# Patient Record
Sex: FEMALE | Race: OTHER | HISPANIC OR LATINO | URBAN - METROPOLITAN AREA
[De-identification: names, ages, dates, MRNs, and addresses within clinical notes are randomized per-mention and may not be internally consistent; named-entity substitution may affect disease eponyms.]

---

## 2017-07-26 ENCOUNTER — EMERGENCY (EMERGENCY)
Facility: HOSPITAL | Age: 62
LOS: 1 days | Discharge: HOME | End: 2017-07-26
Attending: EMERGENCY MEDICINE | Admitting: EMERGENCY MEDICINE
Payer: MEDICARE

## 2017-07-26 VITALS
RESPIRATION RATE: 18 BRPM | HEART RATE: 75 BPM | DIASTOLIC BLOOD PRESSURE: 64 MMHG | SYSTOLIC BLOOD PRESSURE: 110 MMHG | TEMPERATURE: 98 F | OXYGEN SATURATION: 99 %

## 2017-07-26 VITALS
TEMPERATURE: 99 F | WEIGHT: 162.04 LBS | RESPIRATION RATE: 18 BRPM | DIASTOLIC BLOOD PRESSURE: 82 MMHG | SYSTOLIC BLOOD PRESSURE: 187 MMHG | OXYGEN SATURATION: 99 % | HEART RATE: 98 BPM

## 2017-07-26 DIAGNOSIS — Z90.710 ACQUIRED ABSENCE OF BOTH CERVIX AND UTERUS: Chronic | ICD-10-CM

## 2017-07-26 DIAGNOSIS — E03.9 HYPOTHYROIDISM, UNSPECIFIED: ICD-10-CM

## 2017-07-26 DIAGNOSIS — E11.9 TYPE 2 DIABETES MELLITUS WITHOUT COMPLICATIONS: ICD-10-CM

## 2017-07-26 DIAGNOSIS — Z88.8 ALLERGY STATUS TO OTHER DRUGS, MEDICAMENTS AND BIOLOGICAL SUBSTANCES STATUS: ICD-10-CM

## 2017-07-26 DIAGNOSIS — Z79.2 LONG TERM (CURRENT) USE OF ANTIBIOTICS: ICD-10-CM

## 2017-07-26 DIAGNOSIS — R07.89 OTHER CHEST PAIN: ICD-10-CM

## 2017-07-26 DIAGNOSIS — I10 ESSENTIAL (PRIMARY) HYPERTENSION: ICD-10-CM

## 2017-07-26 DIAGNOSIS — R51 HEADACHE: ICD-10-CM

## 2017-07-26 DIAGNOSIS — Z90.49 ACQUIRED ABSENCE OF OTHER SPECIFIED PARTS OF DIGESTIVE TRACT: Chronic | ICD-10-CM

## 2017-07-26 DIAGNOSIS — Z79.4 LONG TERM (CURRENT) USE OF INSULIN: ICD-10-CM

## 2017-07-26 DIAGNOSIS — E78.5 HYPERLIPIDEMIA, UNSPECIFIED: ICD-10-CM

## 2017-07-26 DIAGNOSIS — Z79.82 LONG TERM (CURRENT) USE OF ASPIRIN: ICD-10-CM

## 2017-07-26 DIAGNOSIS — Z79.899 OTHER LONG TERM (CURRENT) DRUG THERAPY: ICD-10-CM

## 2017-07-26 DIAGNOSIS — E89.0 POSTPROCEDURAL HYPOTHYROIDISM: Chronic | ICD-10-CM

## 2017-07-26 LAB
APTT BLD: 36.3 SEC — SIGNIFICANT CHANGE UP (ref 27.5–37.4)
INR BLD: 1.01 — SIGNIFICANT CHANGE UP (ref 0.88–1.16)
PROTHROM AB SERPL-ACNC: 11.2 SEC — SIGNIFICANT CHANGE UP (ref 9.8–12.7)

## 2017-07-26 PROCEDURE — 85610 PROTHROMBIN TIME: CPT

## 2017-07-26 PROCEDURE — 93010 ELECTROCARDIOGRAM REPORT: CPT

## 2017-07-26 PROCEDURE — 99285 EMERGENCY DEPT VISIT HI MDM: CPT | Mod: 25

## 2017-07-26 PROCEDURE — 70450 CT HEAD/BRAIN W/O DYE: CPT

## 2017-07-26 PROCEDURE — 99284 EMERGENCY DEPT VISIT MOD MDM: CPT | Mod: 25

## 2017-07-26 PROCEDURE — 93005 ELECTROCARDIOGRAM TRACING: CPT

## 2017-07-26 PROCEDURE — 96375 TX/PRO/DX INJ NEW DRUG ADDON: CPT

## 2017-07-26 PROCEDURE — 85025 COMPLETE CBC W/AUTO DIFF WBC: CPT

## 2017-07-26 PROCEDURE — 80053 COMPREHEN METABOLIC PANEL: CPT

## 2017-07-26 PROCEDURE — 96374 THER/PROPH/DIAG INJ IV PUSH: CPT

## 2017-07-26 PROCEDURE — 82553 CREATINE MB FRACTION: CPT

## 2017-07-26 PROCEDURE — 70450 CT HEAD/BRAIN W/O DYE: CPT | Mod: 26

## 2017-07-26 PROCEDURE — 82550 ASSAY OF CK (CPK): CPT

## 2017-07-26 PROCEDURE — 36415 COLL VENOUS BLD VENIPUNCTURE: CPT

## 2017-07-26 PROCEDURE — 84484 ASSAY OF TROPONIN QUANT: CPT

## 2017-07-26 PROCEDURE — 85730 THROMBOPLASTIN TIME PARTIAL: CPT

## 2017-07-26 RX ORDER — MAGNESIUM SULFATE 500 MG/ML
2 VIAL (ML) INJECTION ONCE
Qty: 0 | Refills: 0 | Status: COMPLETED | OUTPATIENT
Start: 2017-07-26 | End: 2017-07-26

## 2017-07-26 RX ORDER — METOCLOPRAMIDE HCL 10 MG
10 TABLET ORAL ONCE
Qty: 0 | Refills: 0 | Status: COMPLETED | OUTPATIENT
Start: 2017-07-26 | End: 2017-07-26

## 2017-07-26 RX ORDER — LIDOCAINE 4 G/100G
1 CREAM TOPICAL
Qty: 1 | Refills: 0
Start: 2017-07-26 | End: 2017-08-02

## 2017-07-26 RX ORDER — DIPHENHYDRAMINE HCL 50 MG
25 CAPSULE ORAL ONCE
Qty: 0 | Refills: 0 | Status: COMPLETED | OUTPATIENT
Start: 2017-07-26 | End: 2017-07-26

## 2017-07-26 RX ORDER — ACETAMINOPHEN 500 MG
975 TABLET ORAL ONCE
Qty: 0 | Refills: 0 | Status: COMPLETED | OUTPATIENT
Start: 2017-07-26 | End: 2017-07-26

## 2017-07-26 RX ORDER — LIDOCAINE 4 G/100G
1 CREAM TOPICAL ONCE
Qty: 0 | Refills: 0 | Status: COMPLETED | OUTPATIENT
Start: 2017-07-26 | End: 2017-07-26

## 2017-07-26 RX ADMIN — Medication 25 MILLIGRAM(S): at 19:12

## 2017-07-26 RX ADMIN — Medication 50 GRAM(S): at 20:35

## 2017-07-26 RX ADMIN — Medication 975 MILLIGRAM(S): at 21:05

## 2017-07-26 RX ADMIN — Medication 975 MILLIGRAM(S): at 20:35

## 2017-07-26 RX ADMIN — LIDOCAINE 1 PATCH: 4 CREAM TOPICAL at 22:37

## 2017-07-26 RX ADMIN — Medication 10 MILLIGRAM(S): at 18:40

## 2017-07-26 NOTE — ED ADULT TRIAGE NOTE - CHIEF COMPLAINT QUOTE
"I have a headache coming from the back of my neck for the last 3 days and now I have tightness in my face"  darlene hypertensive in triage states that she took her blood pressure medication today. denies chest pain, sob

## 2017-07-26 NOTE — ED PROVIDER NOTE - MEDICAL DECISION MAKING DETAILS
headache x 3 days, gradual onset, no meningismus, nontoxic appearing, neuro exam nonfocal, will check labs and CT head, cp 2 days, nonexertional, no prior hx of CAD, screening ekg no overt ischemic findings, will trend cardiac panel, analgesia for HA and reassess, low suspicion for SAH

## 2017-07-26 NOTE — ED PROVIDER NOTE - PROGRESS NOTE DETAILS
pt feels improved continues to feel better, nontoxic appearing during reevaluation, ambulatory steadily, nonfocal neuro exam, very low suspicion for SAH.  2nd trop neg

## 2017-07-26 NOTE — ED PROVIDER NOTE - OBJECTIVE STATEMENT
62 yof pw headache x 3 days, from back of her head/right side of neck to front.  gradual onset, initially intermittent, now constant.  no double vision or blurry vision, no weakness, no photophobia.  no modifying or eliciting factors w/ headache.  also reported cp since yesterday, sternal.  hx of HTN.  cp nonexertional, no sob. 62 yof pw headache x 3 days, from back of her head/right side of neck to front.  gradual onset, initially intermittent, now constant.  took advil with some relief.  no double vision or blurry vision, no weakness, no photophobia.  no modifying or eliciting factors w/ headache.  also reported cp since yesterday, sternal.  hx of HTN.  cp nonexertional, no sob.

## 2017-07-26 NOTE — ED PROVIDER NOTE - PHYSICAL EXAMINATION
CON: ao x 3, HENMT: clear oropharynx, soft neck, no meningismus, full ROM of neck, HEAD: atraumatic, CV: rrr, equal pulses b/l, RESP: cta b/l, GI: +BS, soft, nontender, no rebound, no guarding, SKIN: no rash, MSK: tenderness to L cervical paraspinal and trapezius musculature, NEURO: neuro exam nonfocal, perrl, full EOMI, f-n normal, neg pronator, neg romberg, strength 5/5, normal gait, no dysmetria, no dysdiadochokinesia

## 2017-11-21 ENCOUNTER — EMERGENCY (EMERGENCY)
Facility: HOSPITAL | Age: 62
LOS: 1 days | Discharge: HOME | End: 2017-11-21
Admitting: EMERGENCY MEDICINE
Payer: MEDICARE

## 2017-11-21 VITALS
RESPIRATION RATE: 16 BRPM | TEMPERATURE: 98 F | SYSTOLIC BLOOD PRESSURE: 127 MMHG | DIASTOLIC BLOOD PRESSURE: 82 MMHG | OXYGEN SATURATION: 97 % | HEART RATE: 72 BPM

## 2017-11-21 VITALS
WEIGHT: 160.94 LBS | OXYGEN SATURATION: 98 % | SYSTOLIC BLOOD PRESSURE: 167 MMHG | RESPIRATION RATE: 16 BRPM | DIASTOLIC BLOOD PRESSURE: 81 MMHG | HEIGHT: 62 IN | TEMPERATURE: 99 F | HEART RATE: 112 BPM
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
## 2017-11-21 DIAGNOSIS — Z90.710 ACQUIRED ABSENCE OF BOTH CERVIX AND UTERUS: ICD-10-CM

## 2017-11-21 DIAGNOSIS — E11.9 TYPE 2 DIABETES MELLITUS WITHOUT COMPLICATIONS: ICD-10-CM

## 2017-11-21 DIAGNOSIS — E03.9 HYPOTHYROIDISM, UNSPECIFIED: ICD-10-CM

## 2017-11-21 DIAGNOSIS — Z98.890 OTHER SPECIFIED POSTPROCEDURAL STATES: ICD-10-CM

## 2017-11-21 DIAGNOSIS — Z90.710 ACQUIRED ABSENCE OF BOTH CERVIX AND UTERUS: Chronic | ICD-10-CM

## 2017-11-21 DIAGNOSIS — Z90.49 ACQUIRED ABSENCE OF OTHER SPECIFIED PARTS OF DIGESTIVE TRACT: Chronic | ICD-10-CM
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
## 2017-11-21 DIAGNOSIS — Z88.8 ALLERGY STATUS TO OTHER DRUGS, MEDICAMENTS AND BIOLOGICAL SUBSTANCES STATUS: ICD-10-CM

## 2017-11-21 DIAGNOSIS — Z79.82 LONG TERM (CURRENT) USE OF ASPIRIN: ICD-10-CM

## 2017-11-21 DIAGNOSIS — E78.5 HYPERLIPIDEMIA, UNSPECIFIED: ICD-10-CM

## 2017-11-21 DIAGNOSIS — M79.605 PAIN IN LEFT LEG: ICD-10-CM

## 2017-11-21 DIAGNOSIS — I10 ESSENTIAL (PRIMARY) HYPERTENSION: ICD-10-CM

## 2017-11-21 DIAGNOSIS — S89.92XA UNSPECIFIED INJURY OF LEFT LOWER LEG, INITIAL ENCOUNTER: ICD-10-CM

## 2017-11-21 DIAGNOSIS — E89.0 POSTPROCEDURAL HYPOTHYROIDISM: Chronic | ICD-10-CM

## 2017-11-21 PROCEDURE — 73590 X-RAY EXAM OF LOWER LEG: CPT | Mod: 26,LT

## 2017-11-21 PROCEDURE — 99283 EMERGENCY DEPT VISIT LOW MDM: CPT | Mod: 25

## 2017-11-21 PROCEDURE — 99283 EMERGENCY DEPT VISIT LOW MDM: CPT

## 2017-11-21 PROCEDURE — 73590 X-RAY EXAM OF LOWER LEG: CPT

## 2017-11-21 RX ORDER — ACETAMINOPHEN 500 MG
650 TABLET ORAL ONCE
Qty: 0 | Refills: 0 | Status: DISCONTINUED | OUTPATIENT
Start: 2017-11-21 | End: 2017-11-25

## 2017-11-21 RX ORDER — IBUPROFEN 200 MG
400 TABLET ORAL ONCE
Qty: 0 | Refills: 0 | Status: COMPLETED | OUTPATIENT
Start: 2017-11-21 | End: 2017-11-21

## 2017-11-21 RX ADMIN — Medication 400 MILLIGRAM(S): at 14:41

## 2017-11-21 NOTE — ED PROVIDER NOTE - CARE PLAN
Principal Discharge DX:	Shin injury  Instructions for follow-up, activity and diet:	Please follow up with your PMD - RICE

## 2017-11-21 NOTE — ED PROVIDER NOTE - PMH
Anxiety    Diabetes    GERD (gastroesophageal reflux disease)    HLD (hyperlipidemia)    Hypertension    Hypothyroid    Thyroid cancer    Vertigo

## 2017-11-21 NOTE — ED ADULT TRIAGE NOTE - CHIEF COMPLAINT QUOTE
pt c/o pain to left foot & ankle s/p fall a few days ago. swelling & bruising noted to ankle, abrasion on shin.

## 2017-11-21 NOTE — ED ADULT NURSE NOTE - OBJECTIVE STATEMENT
Patient arrived to ED via walk-in stating, "I tripped and fell a few days ago.  My ankle hurts."  Patient is AAOx3, in NAD, complaining of pain to left foot and ankle, with abrasion noted to left knee.  Patient denies LOC, head trauma or any other complaints.  Will continue with plan of care.

## 2017-11-21 NOTE — ED PROVIDER NOTE - OBJECTIVE STATEMENT
61 y/o female with a PMhx of HTN, DM II is present c/o left leg pain. Pt reports she fell last week and noticed pain to the left of her shin. Pt reports pain is constant and increases with walking, but reports she is still able to walk regardless of the pain. Pt denies the following: numbing/tingling sensation, swelling.

## 2017-11-21 NOTE — ED PROVIDER NOTE - MEDICAL DECISION MAKING DETAILS
61 y/o female with left shin pain. PE: minimal swelling with abrasion. Xray negative for acute fx. Able to ambulate safely without pain or abnormal gait. Pt advised RICE for treatment.

## 2018-06-15 ENCOUNTER — EMERGENCY (EMERGENCY)
Facility: HOSPITAL | Age: 63
LOS: 1 days | Discharge: HOME | End: 2018-06-15
Attending: EMERGENCY MEDICINE | Admitting: EMERGENCY MEDICINE
Payer: MEDICARE

## 2018-06-15 VITALS
RESPIRATION RATE: 18 BRPM | OXYGEN SATURATION: 98 % | DIASTOLIC BLOOD PRESSURE: 90 MMHG | SYSTOLIC BLOOD PRESSURE: 144 MMHG | HEART RATE: 82 BPM

## 2018-06-15 VITALS
WEIGHT: 158.07 LBS | SYSTOLIC BLOOD PRESSURE: 158 MMHG | DIASTOLIC BLOOD PRESSURE: 91 MMHG | OXYGEN SATURATION: 98 % | RESPIRATION RATE: 20 BRPM | HEART RATE: 100 BPM | TEMPERATURE: 98 F

## 2018-06-15 DIAGNOSIS — Z88.8 ALLERGY STATUS TO OTHER DRUGS, MEDICAMENTS AND BIOLOGICAL SUBSTANCES: ICD-10-CM

## 2018-06-15 DIAGNOSIS — Z79.2 LONG TERM (CURRENT) USE OF ANTIBIOTICS: ICD-10-CM

## 2018-06-15 DIAGNOSIS — Z90.49 ACQUIRED ABSENCE OF OTHER SPECIFIED PARTS OF DIGESTIVE TRACT: Chronic | ICD-10-CM

## 2018-06-15 DIAGNOSIS — E89.0 POSTPROCEDURAL HYPOTHYROIDISM: Chronic | ICD-10-CM

## 2018-06-15 DIAGNOSIS — I10 ESSENTIAL (PRIMARY) HYPERTENSION: ICD-10-CM

## 2018-06-15 DIAGNOSIS — R00.2 PALPITATIONS: ICD-10-CM

## 2018-06-15 DIAGNOSIS — E11.9 TYPE 2 DIABETES MELLITUS WITHOUT COMPLICATIONS: ICD-10-CM

## 2018-06-15 DIAGNOSIS — Z90.710 ACQUIRED ABSENCE OF BOTH CERVIX AND UTERUS: Chronic | ICD-10-CM

## 2018-06-15 DIAGNOSIS — E03.9 HYPOTHYROIDISM, UNSPECIFIED: ICD-10-CM

## 2018-06-15 DIAGNOSIS — E78.5 HYPERLIPIDEMIA, UNSPECIFIED: ICD-10-CM

## 2018-06-15 DIAGNOSIS — Z79.82 LONG TERM (CURRENT) USE OF ASPIRIN: ICD-10-CM

## 2018-06-15 DIAGNOSIS — Z79.899 OTHER LONG TERM (CURRENT) DRUG THERAPY: ICD-10-CM

## 2018-06-15 LAB
ALBUMIN SERPL ELPH-MCNC: 4.5 G/DL — SIGNIFICANT CHANGE UP (ref 3.3–5)
ALP SERPL-CCNC: 70 U/L — SIGNIFICANT CHANGE UP (ref 40–120)
ALT FLD-CCNC: 30 U/L — SIGNIFICANT CHANGE UP (ref 10–45)
ANION GAP SERPL CALC-SCNC: 13 MMOL/L — SIGNIFICANT CHANGE UP (ref 5–17)
AST SERPL-CCNC: 18 U/L — SIGNIFICANT CHANGE UP (ref 10–40)
BASOPHILS NFR BLD AUTO: 0.2 % — SIGNIFICANT CHANGE UP (ref 0–2)
BILIRUB SERPL-MCNC: 0.4 MG/DL — SIGNIFICANT CHANGE UP (ref 0.2–1.2)
BUN SERPL-MCNC: 10 MG/DL — SIGNIFICANT CHANGE UP (ref 7–23)
CALCIUM SERPL-MCNC: 9.3 MG/DL — SIGNIFICANT CHANGE UP (ref 8.4–10.5)
CHLORIDE SERPL-SCNC: 100 MMOL/L — SIGNIFICANT CHANGE UP (ref 96–108)
CK MB CFR SERPL CALC: 1.6 NG/ML — SIGNIFICANT CHANGE UP (ref 0–6.7)
CK SERPL-CCNC: 161 U/L — SIGNIFICANT CHANGE UP (ref 25–170)
CO2 SERPL-SCNC: 24 MMOL/L — SIGNIFICANT CHANGE UP (ref 22–31)
CREAT SERPL-MCNC: 0.58 MG/DL — SIGNIFICANT CHANGE UP (ref 0.5–1.3)
EOSINOPHIL NFR BLD AUTO: 3.3 % — SIGNIFICANT CHANGE UP (ref 0–6)
EXTRA BLUE TOP TUBE: SIGNIFICANT CHANGE UP
EXTRA SST TUBE: SIGNIFICANT CHANGE UP
GLUCOSE SERPL-MCNC: 248 MG/DL — HIGH (ref 70–99)
HCT VFR BLD CALC: 40.8 % — SIGNIFICANT CHANGE UP (ref 34.5–45)
HGB BLD-MCNC: 14 G/DL — SIGNIFICANT CHANGE UP (ref 11.5–15.5)
LIDOCAIN IGE QN: 16 U/L — SIGNIFICANT CHANGE UP (ref 7–60)
LYMPHOCYTES # BLD AUTO: 25.5 % — SIGNIFICANT CHANGE UP (ref 13–44)
MCHC RBC-ENTMCNC: 28.6 PG — SIGNIFICANT CHANGE UP (ref 27–34)
MCHC RBC-ENTMCNC: 34.3 G/DL — SIGNIFICANT CHANGE UP (ref 32–36)
MCV RBC AUTO: 83.4 FL — SIGNIFICANT CHANGE UP (ref 80–100)
MONOCYTES NFR BLD AUTO: 4.7 % — SIGNIFICANT CHANGE UP (ref 2–14)
NEUTROPHILS NFR BLD AUTO: 66.3 % — SIGNIFICANT CHANGE UP (ref 43–77)
PLATELET # BLD AUTO: 217 K/UL — SIGNIFICANT CHANGE UP (ref 150–400)
POTASSIUM SERPL-MCNC: 4.2 MMOL/L — SIGNIFICANT CHANGE UP (ref 3.5–5.3)
POTASSIUM SERPL-SCNC: 4.2 MMOL/L — SIGNIFICANT CHANGE UP (ref 3.5–5.3)
PROT SERPL-MCNC: 7.1 G/DL — SIGNIFICANT CHANGE UP (ref 6–8.3)
RBC # BLD: 4.89 M/UL — SIGNIFICANT CHANGE UP (ref 3.8–5.2)
RBC # FLD: 13.1 % — SIGNIFICANT CHANGE UP (ref 10.3–16.9)
SODIUM SERPL-SCNC: 137 MMOL/L — SIGNIFICANT CHANGE UP (ref 135–145)
TROPONIN T SERPL-MCNC: <0.01 NG/ML — SIGNIFICANT CHANGE UP (ref 0–0.01)
WBC # BLD: 6.6 K/UL — SIGNIFICANT CHANGE UP (ref 3.8–10.5)
WBC # FLD AUTO: 6.6 K/UL — SIGNIFICANT CHANGE UP (ref 3.8–10.5)

## 2018-06-15 PROCEDURE — 99284 EMERGENCY DEPT VISIT MOD MDM: CPT | Mod: 25

## 2018-06-15 PROCEDURE — 83690 ASSAY OF LIPASE: CPT

## 2018-06-15 PROCEDURE — 82550 ASSAY OF CK (CPK): CPT

## 2018-06-15 PROCEDURE — 93005 ELECTROCARDIOGRAM TRACING: CPT

## 2018-06-15 PROCEDURE — 84443 ASSAY THYROID STIM HORMONE: CPT

## 2018-06-15 PROCEDURE — 80053 COMPREHEN METABOLIC PANEL: CPT

## 2018-06-15 PROCEDURE — 99285 EMERGENCY DEPT VISIT HI MDM: CPT | Mod: 25

## 2018-06-15 PROCEDURE — 84484 ASSAY OF TROPONIN QUANT: CPT

## 2018-06-15 PROCEDURE — 71046 X-RAY EXAM CHEST 2 VIEWS: CPT

## 2018-06-15 PROCEDURE — 36415 COLL VENOUS BLD VENIPUNCTURE: CPT

## 2018-06-15 PROCEDURE — 71046 X-RAY EXAM CHEST 2 VIEWS: CPT | Mod: 26

## 2018-06-15 PROCEDURE — 85025 COMPLETE CBC W/AUTO DIFF WBC: CPT

## 2018-06-15 PROCEDURE — 93010 ELECTROCARDIOGRAM REPORT: CPT

## 2018-06-15 PROCEDURE — 96374 THER/PROPH/DIAG INJ IV PUSH: CPT

## 2018-06-15 PROCEDURE — 82553 CREATINE MB FRACTION: CPT

## 2018-06-15 PROCEDURE — 96375 TX/PRO/DX INJ NEW DRUG ADDON: CPT

## 2018-06-15 RX ORDER — FAMOTIDINE 10 MG/ML
20 INJECTION INTRAVENOUS ONCE
Qty: 0 | Refills: 0 | Status: COMPLETED | OUTPATIENT
Start: 2018-06-15 | End: 2018-06-15

## 2018-06-15 RX ORDER — METOCLOPRAMIDE HCL 10 MG
10 TABLET ORAL ONCE
Qty: 0 | Refills: 0 | Status: COMPLETED | OUTPATIENT
Start: 2018-06-15 | End: 2018-06-15

## 2018-06-15 RX ADMIN — FAMOTIDINE 20 MILLIGRAM(S): 10 INJECTION INTRAVENOUS at 20:10

## 2018-06-15 RX ADMIN — Medication 104 MILLIGRAM(S): at 20:13

## 2018-06-15 NOTE — ED ADULT TRIAGE NOTE - AS O2 DELIVERY
Problem: Knowledge Deficit  Goal: Patient/family/caregiver demonstrates understanding of disease process, treatment plan, medications, and discharge instructions  Complete learning assessment and assess knowledge base    Interventions:  - Provide teaching at level of understanding  - Provide teaching via preferred learning methods   Outcome: Progressing
room air

## 2018-06-15 NOTE — ED PROVIDER NOTE - OBJECTIVE STATEMENT
62 yo female with h/o HTN, DM, anxiety, hypothyroid, in the Er c/o palpitations and chest discomfort, burning like sensations. Pt reports symptoms started today at rest. Pt was seen at [facility] 3 days ago for the same symptoms, was admitted overnight and had stress test done, as well as had multiple times cardiac enzymes checked. Pt reports that her results were normal and she was discharge home yesterday. Pt has f/u appointment scheduled next week. Symptoms started today again and she was afraid to stay at home. At first she went to the same ER at [facility], but because ER was very busy and nurses were changing there shift, pt decided to come to [facility]. Denies fever, chills, CP, abdominal pain or back pain.

## 2018-06-15 NOTE — ED PROVIDER NOTE - ATTENDING CONTRIBUTION TO CARE
63 yof pw   62 yo female with h/o HTN, DM, anxiety, hypothyroid, in the Er c/o palpitations and chest discomfort, burning like sensations. Pt reports symptoms started today at rest. Pt was seen at [place] ER 3 days ago for the same symptoms, was admitted overnight and had stress test done, as well as had multiple times cardiac enzymes checked. Pt reports that her results were normal and she was discharge home yesterday. Pt has f/u appointment scheduled next week. Symptoms started today again and she was afraid to stay at home. At first she went to the same ER at [facility], but because ER was very busy and nurses were changing there shift, pt decided to come to [facility]. Denies fever, chills, CP, abdominal pain or back pain.

## 2018-06-15 NOTE — ED ADULT NURSE NOTE - OBJECTIVE STATEMENT
Pt w/ PMH of HTN, DM, anxiety & hypothyroid presents to ED today c/o palpitations.  Pt elicits symptoms have come and gone over the last week and has been evaluated w/ cardiac enzyme tests x3 times.  Pt states sxs returned today and she presents for reevaluation.  Pt denies dizziness, SOB, cough, fever/chills or edema.  Pt is pending eval by [facility].  Pt on [facility].

## 2018-06-15 NOTE — ED ADULT NURSE NOTE - CHPI ED SYMPTOMS NEG
no chills/no dizziness/no back pain/no chest pain/no fever/no shortness of breath/no diaphoresis/no cough/no syncope/no nausea/no vomiting

## 2018-06-15 NOTE — ED PROVIDER NOTE - MEDICAL DECISION MAKING DETAILS
64 yo female with h/o HTN, DM, anxiety and hypothyroidism, in the ER after sudden onset of palpitations, burning sensations in her chest.   Pt had similar symptoms 3 days ago and was seen at the different ER, admitted for overnight/observation unit and d/steven home next day. Pt reports that her work up was negative.  Pt admits h/o anxiety and was prescribed medications for it. Pt appears anxious and per description of her symptoms tonight seem like she was hyperventilating. ECG- non-ischemic, will continue monitor and check labs. Pt feels much  better at present. No tachycardia, no tachypnea, no COB or CP.

## 2018-06-16 LAB
CK MB CFR SERPL CALC: 1.4 NG/ML — SIGNIFICANT CHANGE UP (ref 0–6.7)
CK SERPL-CCNC: 155 U/L — SIGNIFICANT CHANGE UP (ref 25–170)
TROPONIN T SERPL-MCNC: <0.01 NG/ML — SIGNIFICANT CHANGE UP (ref 0–0.01)

## 2019-02-04 NOTE — ED ADULT TRIAGE NOTE - BP NONINVASIVE SYSTOLIC (MM HG)
----- Message from Ebony Frey sent at 2/4/2019  2:59 PM CST -----  Contact: [phone]   Pt its requesting lab work test results done 01/25/19 . Please advise   
Spoke with patient and she was asking if she had a PT/INR completed and informed patient that she did not have it done but reminded that she has a follow up in a month. Patient stated she would wait.   
158

## 2019-09-26 ENCOUNTER — OFFICE VISIT (OUTPATIENT)
Dept: ORTHOPEDIC SURGERY | Facility: CLINIC | Age: 64
End: 2019-09-26

## 2019-09-26 VITALS
SYSTOLIC BLOOD PRESSURE: 153 MMHG | HEIGHT: 62 IN | RESPIRATION RATE: 16 BRPM | TEMPERATURE: 98 F | WEIGHT: 160 LBS | DIASTOLIC BLOOD PRESSURE: 95 MMHG | BODY MASS INDEX: 29.44 KG/M2 | HEART RATE: 74 BPM

## 2019-09-26 DIAGNOSIS — M75.51 CHRONIC BURSITIS OF RIGHT SHOULDER: Primary | ICD-10-CM

## 2019-09-26 DIAGNOSIS — G89.29 CHRONIC RIGHT SHOULDER PAIN: ICD-10-CM

## 2019-09-26 DIAGNOSIS — M25.511 CHRONIC RIGHT SHOULDER PAIN: ICD-10-CM

## 2019-09-26 RX ORDER — METFORMIN HYDROCHLORIDE EXTENDED-RELEASE TABLETS 1000 MG/1
1000 TABLET, FILM COATED, EXTENDED RELEASE ORAL
COMMUNITY
Start: 2019-08-09

## 2019-09-26 RX ORDER — GUAIFENESIN 100 MG/5ML
81 LIQUID (ML) ORAL
COMMUNITY

## 2019-09-26 RX ORDER — MECLIZINE HYDROCHLORIDE 25 MG/1
25 TABLET ORAL
COMMUNITY
Start: 2019-07-08

## 2019-09-26 RX ORDER — BETAMETHASONE SODIUM PHOSPHATE AND BETAMETHASONE ACETATE 3; 3 MG/ML; MG/ML
6 INJECTION, SUSPENSION INTRA-ARTICULAR; INTRALESIONAL; INTRAMUSCULAR; SOFT TISSUE ONCE
Qty: 0.5 ML | Refills: 0
Start: 2019-09-26 | End: 2019-09-26

## 2019-09-26 RX ORDER — INSULIN ASPART 100 [IU]/ML
INJECTION, SOLUTION INTRAVENOUS; SUBCUTANEOUS
COMMUNITY
Start: 2019-08-09

## 2019-09-26 RX ORDER — METOPROLOL TARTRATE 25 MG/1
25 TABLET, FILM COATED ORAL
COMMUNITY
Start: 2019-06-14

## 2019-09-26 RX ORDER — OMEPRAZOLE 20 MG/1
20 CAPSULE, DELAYED RELEASE ORAL
COMMUNITY
Start: 2019-07-08

## 2019-09-26 RX ORDER — EZETIMIBE 10 MG/1
10 TABLET ORAL
COMMUNITY
Start: 2019-07-08

## 2019-09-26 RX ORDER — ROSUVASTATIN CALCIUM 40 MG/1
40 TABLET, COATED ORAL
COMMUNITY
Start: 2019-07-08

## 2019-09-26 RX ORDER — LEVOTHYROXINE SODIUM 150 UG/1
150 TABLET ORAL
COMMUNITY
Start: 2019-07-08

## 2019-09-26 RX ORDER — AMLODIPINE BESYLATE 5 MG/1
5 TABLET ORAL
COMMUNITY
Start: 2019-07-08

## 2019-09-26 RX ORDER — LISINOPRIL 40 MG/1
40 TABLET ORAL
COMMUNITY
Start: 2019-07-08

## 2019-09-26 RX ORDER — INSULIN GLARGINE 100 [IU]/ML
40 INJECTION, SOLUTION SUBCUTANEOUS
COMMUNITY
Start: 2019-09-09

## 2019-09-26 RX ORDER — DICLOFENAC SODIUM 10 MG/G
2 GEL TOPICAL
COMMUNITY
Start: 2019-08-09

## 2019-09-26 RX ORDER — NAPROXEN 250 MG/1
250 TABLET ORAL
COMMUNITY

## 2019-09-26 RX ORDER — PAROXETINE HYDROCHLORIDE 40 MG/1
40 TABLET, FILM COATED ORAL
COMMUNITY
Start: 2019-08-09

## 2019-09-26 RX ORDER — CLONAZEPAM 0.5 MG/1
0.5 TABLET ORAL
COMMUNITY
Start: 2019-09-09

## 2019-09-26 NOTE — PROGRESS NOTES
Verbal order given by Dr. Duane Jordan to draw up 4mL 0.25% Sensorcaine and 0.5 mL 30 mg/5mL Betamethasone.

## 2019-09-26 NOTE — PROGRESS NOTES
Patient: Maulik Galeano                MRN: [id number]       SSN: [id number]  YOB: 1955        AGE: 62 y.o. SEX: female    PCP: No primary care provider on file. 09/26/19    CC: RIGHT SHOULDER PAIN    HISTORY:  Maulik Galeano is a 62 y.o. female who is seen for right shoulder pain. She has been experiencing shoulder pain for the past 4 months. She does not recall any injury. She notes shoulder pain with overhead activities and at night. She has to sleep with her arm adducted and her hand across her chest.  She has difficulty laying on her right side. She feels more pain when the weather changes. She also notes shoulder pain radiating to her right elbow. She has difficulty even putting her bra on. She has seen her PCP and received a right shoulder cortisone injection. She feels like her shoulder feels like a stretched rubber band. She cannot even pull her pants up. She is right handed. She has been taking Advil with some relief. Her pain causes her to cry sometimes. Pain Assessment  9/26/2019   Location of Pain Arm   Location Modifiers Right   Severity of Pain 0   Limiting Behavior No     Occupation, etc:  Ms. Haley Lawson receives social security disability benefits for depression. 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 She mostly stays at home now. She was born in [place] and raised in [place]. She lives with her twin 13 yo and 23 yo grandsons in [place]. She has 1 son, 4 daughters, 23 grandchildren and 5 great grandchildren. Ms. Haley Lawson weighs 160 lbs and is 5'2\" tall. No results found for: HBA1C, HGBE8, EVX3XVIO, YUB4XLWP, PLF6POYN  Weight Metrics 9/26/2019   Weight 160 lb   BMI 29.26 kg/m2       There is no problem list on file for this patient. REVIEW OF SYSTEMS: All Below are Negative except: See HPI   Constitutional: negative for fever, chills, and weight loss.    Cardiovascular: negative for chest pain, claudication, leg swelling, SOB, DOSHI   Gastrointestinal: Negative for pain, N/V/C/D, Blood in stool or urine, dysuria, hematuria, incontinence, pelvic pain. Musculoskeletal: See HPI   Neurological: Negative for dizziness and weakness. Negative for headaches, Visual changes, confusion, seizures   Phychiatric/Behavioral: Negative for depression, memory loss, substance abuse. Extremities: Negative for hair changes, rash, or skin lesion changes. Hematologic: Negative for bleeding problems, bruising, pallor or swollen lymph nodes   Peripheral Vascular: No calf pain, no circulation deficits.     Social History     Socioeconomic History    Marital status: SINGLE     Spouse name: Not on file    Number of children: Not on file    Years of education: Not on file    Highest education level: Not on file   Occupational History    Not on file   Social Needs    Financial resource strain: Not on file    Food insecurity:     Worry: Not on file     Inability: Not on file    Transportation needs:     Medical: Not on file     Non-medical: Not on file   Tobacco Use    Smoking status: Former Smoker    Smokeless tobacco: Never Used   Substance and Sexual Activity    Alcohol use: Never     Frequency: Never    Drug use: Never    Sexual activity: Not on file   Lifestyle    Physical activity:     Days per week: Not on file     Minutes per session: Not on file    Stress: Not on file   Relationships    Social connections:     Talks on phone: Not on file     Gets together: Not on file     Attends Orthodox service: Not on file     Active member of club or organization: Not on file     Attends meetings of clubs or organizations: Not on file     Relationship status: Not on file    Intimate partner violence:     Fear of current or ex partner: Not on file     Emotionally abused: Not on file     Physically abused: Not on file     Forced sexual activity: Not on file   Other Topics Concern    Not on file   Social History Narrative    Not on file      Allergies   Allergen Reactions    Gabapentin Anaphylaxis      Current Outpatient Medications   Medication Sig    amLODIPine (NORVASC) 5 mg tablet Take 5 mg by mouth.  aspirin 81 mg chewable tablet Take 81 mg by mouth.  glucose blood VI test strips (ASCENSIA AUTODISC VI, ONE TOUCH ULTRA TEST VI) strip Check glucose 3 times a day, Dx insulin dependent type 2 DM    clonazePAM (KLONOPIN) 0.5 mg tablet Take 0.5 mg by mouth.  diclofenac (VOLTAREN) 1 % gel Apply 2 g to affected area.  ezetimibe (ZETIA) 10 mg tablet Take 10 mg by mouth.  insulin aspart U-100 (NOVOLOG) 100 unit/mL (3 mL) inpn 15 units in the morning, 15 units in afternoon, 15 units in evening    insulin glargine (LANTUS,BASAGLAR) 100 unit/mL (3 mL) inpn 40 Units by SubCUTAneous route.  levothyroxine (SYNTHROID) 150 mcg tablet Take 150 mcg by mouth.  lisinopril (PRINIVIL, ZESTRIL) 40 mg tablet Take 40 mg by mouth.  meclizine (ANTIVERT) 25 mg tablet Take 25 mg by mouth.  metFORMIN ER 1,000 mg tr24 Take 1,000 mg by mouth.  metoprolol tartrate (LOPRESSOR) 25 mg tablet Take 25 mg by mouth.  naproxen (NAPROSYN) 250 mg tablet Take 250 mg by mouth.  omeprazole (PRILOSEC) 20 mg capsule Take 20 mg by mouth.  PARoxetine (PAXIL) 40 mg tablet Take 40 mg by mouth.  rosuvastatin (CRESTOR) 40 mg tablet Take 40 mg by mouth.  betamethasone (CELESTONE SOLUSPAN) 6 mg/mL injection 1 mL by Intra artICUlar route once for 1 dose. No current facility-administered medications for this visit.        PHYSICAL EXAMINATION:  Visit Vitals  BP (!) 153/95   Pulse 74   Temp 98 °F (36.7 °C) (Oral)   Resp 16   Ht 5' 2\" (1.575 m)   Wt 160 lb (72.6 kg)   BMI 29.26 kg/m²      ORTHO EXAMINATION:  Examination Right shoulder Left shoulder   Skin Intact Intact   Effusion - -   Biceps deformity - -   Atrophy - -   AC joint tenderness - -   Acromial tenderness + -   Biceps tenderness - -   Forward flexion/Elevation  ARRON >90 170 Active abduction  160   External rotation ROM 5 30   Internal rotation ROM 70 90   Apprehension - -   Impingement + -   Drop Arm Test - -   Neurovascular Intact Intact     Pain on returning arm to side    TIME OUT:  Chart reviewed for the following:   I, Christiano Cristina MD, have reviewed the History, Physical and updated the Allergic reactions for [address] performed immediately prior to start of procedure:  Lynn Juarez MD, have performed the following reviews on Trav Carty prior to the start of the procedure:          * Patient was identified by name and date of birth   * Agreement on procedure being performed was verified  * Risks and Benefits explained to the patient  * Procedure site verified and marked as necessary  * Patient was positioned for comfort  * Consent was obtained     Time: 9:02 AM     Date of procedure: 9/26/2019  Procedure performed by:  Christiano Cristina MD  Ms. Ondina Guaman tolerated the procedure well with no complications. RADIOGRAPHS:  XR RIGHT SHOULDER 5/16/19 [facility] ED  IMPRESSION  Negative for fracture or dislocation in the right shoulder.  -I have independently reviewed these images during this office visit. -Dr. Jazmyne Fischer:  Three views - No fractures, moderate acromioclavicular narrowing, no glenohumeral narrowing, + calcific densities, small inferior osteophyte      IMPRESSION:      ICD-10-CM ICD-9-CM    1. Chronic bursitis of right shoulder M75.51 726.10 betamethasone (CELESTONE SOLUSPAN) 6 mg/mL injection      BETAMETHASONE ACETATE & SODIUM PHOSPHATE INJECTION 3 MG EA.      DRAIN/INJECT LARGE JOINT/BURSA      REFERRAL TO PHYSICAL THERAPY   2.  Chronic right shoulder pain M25.511 719.41 betamethasone (CELESTONE SOLUSPAN) 6 mg/mL injection    G89.29 338.29 BETAMETHASONE ACETATE & SODIUM PHOSPHATE INJECTION 3 MG EA.      DRAIN/INJECT LARGE JOINT/BURSA      REFERRAL TO PHYSICAL THERAPY     PLAN:  After discussing treatment options, patient's right shoulder was injected with 4 cc Marcaine and 1/2 cc Celestone. There is no need for surgery at this time. We discussed a possible need for right shoulder MR arthrogram in the future if pain continues. She will start a brief course of outpatient physical therapy. She will follow up as needed.       Scribed by Dmitri Maria (Kurt Mckeon) as dictated by Ad Sierra MD

## 2019-10-02 ENCOUNTER — APPOINTMENT (OUTPATIENT)
Dept: PHYSICAL THERAPY | Age: 64
End: 2019-10-02

## 2019-12-03 ENCOUNTER — HOSPITAL ENCOUNTER (OUTPATIENT)
Dept: GENERAL RADIOLOGY | Age: 64
Discharge: HOME | End: 2019-12-03
Attending: SPECIALIST
Payer: MEDICARE

## 2019-12-03 ENCOUNTER — HOSPITAL ENCOUNTER (OUTPATIENT)
Dept: MRI IMAGING | Age: 64
Discharge: HOME | End: 2019-12-03
Attending: SPECIALIST
Payer: MEDICARE

## 2019-12-03 DIAGNOSIS — M25.511 CHRONIC RIGHT SHOULDER PAIN: ICD-10-CM

## 2019-12-03 DIAGNOSIS — M75.51 CHRONIC BURSITIS OF RIGHT SHOULDER: ICD-10-CM

## 2019-12-03 DIAGNOSIS — G89.29 CHRONIC RIGHT SHOULDER PAIN: ICD-10-CM

## 2019-12-03 PROCEDURE — 74011250636 HC RX REV CODE- 250/636: Performed by: SPECIALIST

## 2019-12-03 PROCEDURE — A9575 INJ GADOTERATE MEGLUMI 0.1ML: HCPCS | Performed by: SPECIALIST

## 2019-12-03 PROCEDURE — 74011000250 HC RX REV CODE- 250: Performed by: SPECIALIST

## 2019-12-03 PROCEDURE — 73222 MRI JOINT UPR EXTREM W/DYE: CPT

## 2019-12-03 PROCEDURE — 77002 NEEDLE LOCALIZATION BY XRAY: CPT

## 2019-12-03 PROCEDURE — 74011636320 HC RX REV CODE- 636/320: Performed by: SPECIALIST

## 2019-12-03 RX ORDER — SODIUM CHLORIDE 9 MG/ML
10 INJECTION INTRAMUSCULAR; INTRAVENOUS; SUBCUTANEOUS
Status: COMPLETED | OUTPATIENT
Start: 2019-12-03 | End: 2019-12-03

## 2019-12-03 RX ORDER — LIDOCAINE HYDROCHLORIDE 10 MG/ML
30 INJECTION, SOLUTION EPIDURAL; INFILTRATION; INTRACAUDAL; PERINEURAL
Status: COMPLETED | OUTPATIENT
Start: 2019-12-03 | End: 2019-12-03

## 2019-12-03 RX ORDER — GADOTERATE MEGLUMINE 376.9 MG/ML
5 INJECTION INTRAVENOUS
Status: COMPLETED | OUTPATIENT
Start: 2019-12-03 | End: 2019-12-03

## 2019-12-03 RX ADMIN — LIDOCAINE HYDROCHLORIDE 10 ML: 10 INJECTION, SOLUTION EPIDURAL; INFILTRATION; INTRACAUDAL; PERINEURAL at 12:07

## 2019-12-03 RX ADMIN — SODIUM CHLORIDE 10 ML: 9 INJECTION, SOLUTION INTRAMUSCULAR; INTRAVENOUS; SUBCUTANEOUS at 12:07

## 2019-12-03 RX ADMIN — GADOTERATE MEGLUMINE 0.15 ML: 376.9 INJECTION INTRAVENOUS at 12:07

## 2019-12-03 RX ADMIN — IOPAMIDOL 11 ML: 408 INJECTION, SOLUTION INTRATHECAL at 12:07

## 2019-12-12 ENCOUNTER — OFFICE VISIT (OUTPATIENT)
Dept: ORTHOPEDIC SURGERY | Facility: CLINIC | Age: 64
End: 2019-12-12

## 2019-12-12 VITALS
BODY MASS INDEX: 29.3 KG/M2 | DIASTOLIC BLOOD PRESSURE: 93 MMHG | RESPIRATION RATE: 15 BRPM | SYSTOLIC BLOOD PRESSURE: 168 MMHG | HEIGHT: 62 IN | WEIGHT: 159.2 LBS | HEART RATE: 79 BPM | TEMPERATURE: 96.8 F

## 2019-12-12 DIAGNOSIS — M25.511 CHRONIC RIGHT SHOULDER PAIN: ICD-10-CM

## 2019-12-12 DIAGNOSIS — G89.29 CHRONIC RIGHT SHOULDER PAIN: ICD-10-CM

## 2019-12-12 DIAGNOSIS — S43.431A LABRAL TEAR OF SHOULDER, RIGHT, INITIAL ENCOUNTER: Primary | ICD-10-CM

## 2019-12-12 NOTE — PROGRESS NOTES
Patient: Mark Domínguez                MRN: [id number]       SSN: [id number]  YOB: 1955        AGE: 62 y.o. SEX: female    PCP: Becca Monson MD  12/12/19    CC: RIGHT SHOULDER PAIN    HISTORY:  Mark Domínguez is a 62 y.o. female who is seen for continued right shoulder pain. She has been experiencing shoulder pain for the past 4 months. She does not recall any injury. She notes shoulder pain with overhead activities and at night. She has to sleep with her arm adducted and her hand across her chest.  She has difficulty laying on her right side. She feels more pain when the weather changes. She also notes shoulder pain radiating to her right elbow. She has difficulty even putting her bra on. She has seen her PCP and received a right shoulder cortisone injection. She feels like her shoulder feels like a stretched rubber band. She cannot even pull her pants up. Sometimes her right arm gives away while she is pouring her coffee in the morning. She is right handed. She has been taking Advil with some relief. Her pain causes her to cry sometimes. Pain Assessment  12/12/2019   Location of Pain Shoulder   Location Modifiers Right   Severity of Pain 6   Quality of Pain Aching;Dull   Duration of Pain Persistent   Frequency of Pain Constant   Aggravating Factors Bending;Stretching;Straightening   Aggravating Factors Comment holding heavy object   Limiting Behavior -   Relieving Factors Nothing   Result of Injury No     Occupation, etc:  Ms. Nancy Carrington receives social security disability benefits for depression. 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 She mostly stays at home now. She was born in [place] and raised in [place]. She lives with her twin 11 yo and 23 yo grandsons in [place]. She has 1 son, 4 daughters, 23 grandchildren and 5 great grandchildren. Ms. Nancy Carrington weighs 160 lbs and is 5'2\" tall.        No results found for: HBA1C, HGBE8, ZPH5DKFR, TMJ2IVAL, JYV5ZAUD  Weight Metrics 12/12/2019 9/26/2019   Weight 159 lb 3.2 oz 160 lb   BMI 29.12 kg/m2 29.26 kg/m2       There is no problem list on file for this patient. REVIEW OF SYSTEMS: All Below are Negative except: See HPI   Constitutional: negative for fever, chills, and weight loss. Cardiovascular: negative for chest pain, claudication, leg swelling, SOB, DOSHI   Gastrointestinal: Negative for pain, N/V/C/D, Blood in stool or urine, dysuria, hematuria, incontinence, pelvic pain. Musculoskeletal: See HPI   Neurological: Negative for dizziness and weakness. Negative for headaches, Visual changes, confusion, seizures   Phychiatric/Behavioral: Negative for depression, memory loss, substance abuse. Extremities: Negative for hair changes, rash, or skin lesion changes. Hematologic: Negative for bleeding problems, bruising, pallor or swollen lymph nodes   Peripheral Vascular: No calf pain, no circulation deficits.     Social History     Socioeconomic History    Marital status: SINGLE     Spouse name: Not on file    Number of children: Not on file    Years of education: Not on file    Highest education level: Not on file   Occupational History    Not on file   Social Needs    Financial resource strain: Not on file    Food insecurity:     Worry: Not on file     Inability: Not on file    Transportation needs:     Medical: Not on file     Non-medical: Not on file   Tobacco Use    Smoking status: Former Smoker    Smokeless tobacco: Never Used   Substance and Sexual Activity    Alcohol use: Never     Frequency: Never    Drug use: Never    Sexual activity: Not on file   Lifestyle    Physical activity:     Days per week: Not on file     Minutes per session: Not on file    Stress: Not on file   Relationships    Social connections:     Talks on phone: Not on file     Gets together: Not on file     Attends Congregation service: Not on file     Active member of club or organization: Not on file     Attends meetings of clubs or organizations: Not on file     Relationship status: Not on file    Intimate partner violence:     Fear of current or ex partner: Not on file     Emotionally abused: Not on file     Physically abused: Not on file     Forced sexual activity: Not on file   Other Topics Concern    Not on file   Social History Narrative    Not on file      Allergies   Allergen Reactions    Gabapentin Anaphylaxis      Current Outpatient Medications   Medication Sig    amLODIPine (NORVASC) 5 mg tablet Take 5 mg by mouth.  aspirin 81 mg chewable tablet Take 81 mg by mouth.  glucose blood VI test strips (ASCENSIA AUTODISC VI, ONE TOUCH ULTRA TEST VI) strip Check glucose 3 times a day, Dx insulin dependent type 2 DM    clonazePAM (KLONOPIN) 0.5 mg tablet Take 0.5 mg by mouth.  ezetimibe (ZETIA) 10 mg tablet Take 10 mg by mouth.  insulin aspart U-100 (NOVOLOG) 100 unit/mL (3 mL) inpn 15 units in the morning, 15 units in afternoon, 15 units in evening    insulin glargine (LANTUS,BASAGLAR) 100 unit/mL (3 mL) inpn 40 Units by SubCUTAneous route.  levothyroxine (SYNTHROID) 150 mcg tablet Take 150 mcg by mouth.  lisinopril (PRINIVIL, ZESTRIL) 40 mg tablet Take 40 mg by mouth.  meclizine (ANTIVERT) 25 mg tablet Take 25 mg by mouth.  metFORMIN ER 1,000 mg tr24 Take 1,000 mg by mouth.  metoprolol tartrate (LOPRESSOR) 25 mg tablet Take 25 mg by mouth.  omeprazole (PRILOSEC) 20 mg capsule Take 20 mg by mouth.  PARoxetine (PAXIL) 40 mg tablet Take 40 mg by mouth.  rosuvastatin (CRESTOR) 40 mg tablet Take 40 mg by mouth.  diclofenac (VOLTAREN) 1 % gel Apply 2 g to affected area.  naproxen (NAPROSYN) 250 mg tablet Take 250 mg by mouth. No current facility-administered medications for this visit.        PHYSICAL EXAMINATION:  Visit Vitals  BP (!) 168/93 (BP 1 Location: Left arm, BP Patient Position: Sitting)   Pulse 79   Temp 96.8 °F (36 °C) (Oral) Resp 15   Ht 5' 2\" (1.575 m)   Wt 159 lb 3.2 oz (72.2 kg)   BMI 29.12 kg/m²      ORTHO EXAMINATION:  Examination Right shoulder Left shoulder   Skin Intact Intact   Effusion - -   Biceps deformity - -   Atrophy - -   AC joint tenderness - -   Acromial tenderness + -   Biceps tenderness - -   Forward flexion/Elevation  ARRON >90 170   Active abduction  160   External rotation ROM 5 30   Internal rotation ROM 70 90   Apprehension - -   Impingement + -   Drop Arm Test - -   Neurovascular Intact Intact     Pain on returning arm to side    RIGHT SHOULDER MR ARTHROGRAM 12/3/19 [facility]  IMPRESSION:     Marked degenerative tearing of the labrum as described.  -A few probable full-thickness glenoid cartilage fissures, not well visualized.     Supraspinatus and infraspinatus tendinopathy without discrete tear.  -Acromion with type III morphology, and mild AC joint degenerative changes, but  no significant lateral downsloping.  -Trace subacromial subdeltoid bursal edema.     There is some infiltration or extravasation of contrast along the inferior  glenohumeral capsule/ligaments. Although these findings may be  iatrogenic/injection related, inferior glenohumeral ligamental/capsular injury  is difficult to exclude. Clinical correlation advised.     Perhaps mild intra-articular biceps tendinopathy. RADIOGRAPHS:  XR RIGHT SHOULDER 5/16/19 [facility] ED  IMPRESSION  Negative for fracture or dislocation in the right shoulder.  -I have independently reviewed these images during this office visit. -Dr. Sara Banerjee:  Three views - No fractures, moderate acromioclavicular narrowing, no glenohumeral narrowing, + calcific densities, small inferior osteophyte      IMPRESSION:      ICD-10-CM ICD-9-CM    1. Labral tear of shoulder, right, initial encounter S43.431A 840.8    2. Chronic right shoulder pain M25.511 719.41     G89.29 338.29      PLAN:  Right shoulder MR arthrogram results discussed.  We discussed possible need for right shoulder arthroscopy & labrum repair at some time in the future if pain continues. She will follow up PRN with Dr. Joleen Francisco for orthopedic shoulder surgery consultation.      Scribed by Genna Garza (Cyndi Blevins) as dictated by Candice Alex MD

## 2019-12-12 NOTE — PROGRESS NOTES
1. Have you been to the ER, urgent care clinic since your last visit? Hospitalized since your last visit? No    2. Have you seen or consulted any other health care providers outside of the [address] [place] [address] since your last visit? Include any pap smears or colon screening.  No

## 2019-12-17 ENCOUNTER — TELEPHONE (OUTPATIENT)
Dept: ORTHOPEDIC SURGERY | Facility: CLINIC | Age: 64
End: 2019-12-17

## 2019-12-17 NOTE — TELEPHONE ENCOUNTER
Addressed with Dr. Scooby Peters and he stated that because of the opioid crisis he was unable to give the patient any pain medication. She should use OTC pain relief as directed. I called the patient and let her know that we could not prescribe her anything. She stated she understood.

## 2019-12-17 NOTE — TELEPHONE ENCOUNTER
Patient called and advised she is in extreme pain - she asked if [facility] can prescribe her pain medication for her shoulder to hold her over until her new patient appt with [facility] on 12/20/19.        Patient can be reached: [phone]

## 2019-12-20 ENCOUNTER — OFFICE VISIT (OUTPATIENT)
Dept: ORTHOPEDIC SURGERY | Facility: CLINIC | Age: 64
End: 2019-12-20

## 2019-12-20 VITALS
WEIGHT: 159 LBS | RESPIRATION RATE: 16 BRPM | HEART RATE: 78 BPM | TEMPERATURE: 97.8 F | OXYGEN SATURATION: 98 % | HEIGHT: 62 IN | BODY MASS INDEX: 29.26 KG/M2 | DIASTOLIC BLOOD PRESSURE: 88 MMHG | SYSTOLIC BLOOD PRESSURE: 156 MMHG

## 2019-12-20 DIAGNOSIS — M75.01 ADHESIVE CAPSULITIS OF RIGHT SHOULDER: Primary | ICD-10-CM

## 2019-12-20 DIAGNOSIS — M25.511 CHRONIC RIGHT SHOULDER PAIN: ICD-10-CM

## 2019-12-20 DIAGNOSIS — G89.29 CHRONIC RIGHT SHOULDER PAIN: ICD-10-CM

## 2019-12-20 RX ORDER — MELOXICAM 15 MG/1
15 TABLET ORAL DAILY
Qty: 90 TAB | Refills: 2 | Status: SHIPPED | OUTPATIENT
Start: 2019-12-20

## 2019-12-20 NOTE — PROGRESS NOTES
1. Have you been to the ER, urgent care clinic since your last visit? Hospitalized since your last visit? NO    2. Have you seen or consulted any other health care providers outside of the [address] [place] [address] since your last visit? Include any pap smears or colon screening.    NO

## 2019-12-20 NOTE — PROGRESS NOTES
Patient: Jhoan Ken                MRN: [id number]       SSN: [id number]  YOB: 1955        AGE: 62 y.o. SEX: female  Body mass index is 29.08 kg/m². PCP: Bita Burnett MD  12/20/19    Chief Complaint: Right shoulder pain    HISTORY OF PRESENT ILLNESS:  Augie Chang presents to the office today with right shoulder pain. She has had pain for about seven or eight months. Her pain began insidiously. She has had injections and physical therapy with continued pain. She has pain with reaching overhead, as well as out to her side. Past Medical History:   Diagnosis Date    Cancer [facility])     Thyroid cancer    Diabetes ([facility] [address]     Hypertension     Thyroid disease        No family history on file. Current Outpatient Medications   Medication Sig Dispense Refill    amLODIPine (NORVASC) 5 mg tablet Take 5 mg by mouth.  aspirin 81 mg chewable tablet Take 81 mg by mouth. 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  ezetimibe (ZETIA) 10 mg tablet Take 10 mg by mouth.  insulin aspart U-100 (NOVOLOG) 100 unit/mL (3 mL) inpn 15 units in the morning, 15 units in afternoon, 15 units in evening      insulin glargine (LANTUS,BASAGLAR) 100 unit/mL (3 mL) inpn 40 Units by SubCUTAneous route.  levothyroxine (SYNTHROID) 150 mcg tablet Take 150 mcg by mouth.  lisinopril (PRINIVIL, ZESTRIL) 40 mg tablet Take 40 mg by mouth.  meclizine (ANTIVERT) 25 mg tablet Take 25 mg by mouth.  metFORMIN ER 1,000 mg tr24 Take 1,000 mg by mouth.  metoprolol tartrate (LOPRESSOR) 25 mg tablet Take 25 mg by mouth.  omeprazole (PRILOSEC) 20 mg capsule Take 20 mg by mouth.  PARoxetine (PAXIL) 40 mg tablet Take 40 mg by mouth.  rosuvastatin (CRESTOR) 40 mg tablet Take 40 mg by mouth.  diclofenac (VOLTAREN) 1 % gel Apply 2 g to affected area.  naproxen (NAPROSYN) 250 mg tablet Take 250 mg by mouth.          Allergies   Allergen Reactions    Gabapentin Anaphylaxis       Past Surgical History:   Procedure Laterality Date    HAND/FINGER SURGERY UNLISTED         Social History     Socioeconomic History    Marital status: SINGLE     Spouse name: Not on file    Number of children: Not on file    Years of education: Not on file    Highest education level: Not on file   Occupational History    Not on file   Social Needs    Financial resource strain: Not on file    Food insecurity:     Worry: Not on file     Inability: Not on file    Transportation needs:     Medical: Not on file     Non-medical: Not on file   Tobacco Use    Smoking status: Former Smoker    Smokeless tobacco: Never Used   Substance and Sexual Activity    Alcohol use: Never     Frequency: Never    Drug use: Never    Sexual activity: Not on file   Lifestyle    Physical activity:     Days per week: Not on file     Minutes per session: Not on file    Stress: Not on file   Relationships    Social connections:     Talks on phone: Not on file     Gets together: Not on file     Attends Gnosticism service: Not on file     Active member of club or organization: Not on file     Attends meetings of clubs or organizations: Not on file     Relationship status: Not on file    Intimate partner violence:     Fear of current or ex partner: Not on file     Emotionally abused: Not on file     Physically abused: Not on file     Forced sexual activity: Not on file   Other Topics Concern    Not on file   Social History Narrative    Not on file       REVIEW OF SYSTEMS:      CON: negative for recent weight loss/gain, fever, or chills  EYE: negative for double or blurry vision  ENT: negative for hoarseness  RS:   negative for cough, URI, SOB  CV:  negative for chest pain, palpitations  GI:    negative for blood in stool, nausea/vomiting  :  negative for blood in urine  MS: As per HPI  Other systems reviewed and noted below. PHYSICAL EXAMINATION:  Visit Vitals  /88 (BP 1 Location: Left arm, BP Patient Position: Sitting)   Pulse 78   Temp 97.8 °F (36.6 °C) (Oral)   Resp 16   Ht 5' 2\" (1.575 m)   Wt 159 lb (72.1 kg)   SpO2 98%   BMI 29.08 kg/m²     Body mass index is 29.08 kg/m². GENERAL: Alert and oriented x3, in no acute distress, well-developed, well-nourished. HEENT: Normocephalic, atraumatic. RESP: Non labored breathing with equal chest rise on inspiration. CV: Well perfused extremities. No cyanosis or clubbing noted. ABDOMEN: Soft, non-tender, non-distended. PHYSICAL EXAM:  Physical exam of the right shoulder with decreased range of motion in all planes. She has pain with capsular stretch. No obvious pain with rotator cuff strength testing. She is neurovascularly intact distally. IMAGING:  X-rays of the right shoulder were taken in the office today. They do show some mild thinning of the glenohumeral joint with a possible inferior glenoid osteophyte, but no significant arthrosis. An MRI was also reviewed, which does not show any full thickness rotator cuff tears. ASSESSMENT AND PLAN:   Julio C Mcgee is a 62year-old female with right shoulder pain concerning for adhesive capsulitis. I have recommended a home exercise program, as well as an oral antiinflammatory.             Electronically signed by: Donnell Suero MD

## 2022-02-09 NOTE — ED ADULT TRIAGE NOTE - TEMPERATURE IN CELSIUS (DEGREES C)
[FreeTextEntry1] : Patient presents back today noting he is feeling better although still having issues both with weakness in his right leg and some swelling in his right foot.  He saw a neurologist who believes the weakness may be secondary to a pinched nerve in his spine.  That evaluation is ongoing.  Edema is somewhat better although still present at times.  He otherwise feels well and offers no new complaints.  Patient denies chest pain, shortness of breath, palpitations, orthopnea, presyncope, syncope.
36.7

## 2022-04-15 ENCOUNTER — INPATIENT (INPATIENT)
Facility: HOSPITAL | Age: 67
LOS: 4 days | Discharge: HOME | DRG: 247 | End: 2022-04-20
Attending: INTERNAL MEDICINE | Admitting: INTERNAL MEDICINE
Payer: MEDICARE

## 2022-04-15 VITALS
HEART RATE: 105 BPM | TEMPERATURE: 98 F | OXYGEN SATURATION: 98 % | WEIGHT: 160.06 LBS | SYSTOLIC BLOOD PRESSURE: 185 MMHG | RESPIRATION RATE: 18 BRPM | HEIGHT: 62 IN | DIASTOLIC BLOOD PRESSURE: 78 MMHG

## 2022-04-15 DIAGNOSIS — E89.0 POSTPROCEDURAL HYPOTHYROIDISM: Chronic | ICD-10-CM

## 2022-04-15 DIAGNOSIS — Z90.710 ACQUIRED ABSENCE OF BOTH CERVIX AND UTERUS: Chronic | ICD-10-CM

## 2022-04-15 DIAGNOSIS — I24.9 ACUTE ISCHEMIC HEART DISEASE, UNSPECIFIED: ICD-10-CM

## 2022-04-15 DIAGNOSIS — I10 ESSENTIAL (PRIMARY) HYPERTENSION: ICD-10-CM

## 2022-04-15 DIAGNOSIS — F41.9 ANXIETY DISORDER, UNSPECIFIED: ICD-10-CM

## 2022-04-15 DIAGNOSIS — E03.9 HYPOTHYROIDISM, UNSPECIFIED: ICD-10-CM

## 2022-04-15 DIAGNOSIS — K21.9 GASTRO-ESOPHAGEAL REFLUX DISEASE WITHOUT ESOPHAGITIS: ICD-10-CM

## 2022-04-15 DIAGNOSIS — R63.8 OTHER SYMPTOMS AND SIGNS CONCERNING FOOD AND FLUID INTAKE: ICD-10-CM

## 2022-04-15 DIAGNOSIS — E11.9 TYPE 2 DIABETES MELLITUS WITHOUT COMPLICATIONS: ICD-10-CM

## 2022-04-15 DIAGNOSIS — Z90.49 ACQUIRED ABSENCE OF OTHER SPECIFIED PARTS OF DIGESTIVE TRACT: Chronic | ICD-10-CM

## 2022-04-15 DIAGNOSIS — E78.5 HYPERLIPIDEMIA, UNSPECIFIED: ICD-10-CM

## 2022-04-15 LAB
ALBUMIN SERPL ELPH-MCNC: 4.6 G/DL — SIGNIFICANT CHANGE UP (ref 3.3–5)
ALP SERPL-CCNC: 65 U/L — SIGNIFICANT CHANGE UP (ref 40–120)
ALT FLD-CCNC: 36 U/L — SIGNIFICANT CHANGE UP (ref 10–45)
ANION GAP SERPL CALC-SCNC: 13 MMOL/L — SIGNIFICANT CHANGE UP (ref 5–17)
APPEARANCE UR: CLEAR — SIGNIFICANT CHANGE UP
AST SERPL-CCNC: 19 U/L — SIGNIFICANT CHANGE UP (ref 10–40)
BASOPHILS # BLD AUTO: 0.02 K/UL — SIGNIFICANT CHANGE UP (ref 0–0.2)
BASOPHILS NFR BLD AUTO: 0.3 % — SIGNIFICANT CHANGE UP (ref 0–2)
BILIRUB SERPL-MCNC: 0.4 MG/DL — SIGNIFICANT CHANGE UP (ref 0.2–1.2)
BILIRUB UR-MCNC: NEGATIVE — SIGNIFICANT CHANGE UP
BUN SERPL-MCNC: 8 MG/DL — SIGNIFICANT CHANGE UP (ref 7–23)
CALCIUM SERPL-MCNC: 9.5 MG/DL — SIGNIFICANT CHANGE UP (ref 8.4–10.5)
CHLORIDE SERPL-SCNC: 102 MMOL/L — SIGNIFICANT CHANGE UP (ref 96–108)
CO2 SERPL-SCNC: 24 MMOL/L — SIGNIFICANT CHANGE UP (ref 22–31)
COLOR SPEC: YELLOW — SIGNIFICANT CHANGE UP
CREAT SERPL-MCNC: 0.5 MG/DL — SIGNIFICANT CHANGE UP (ref 0.5–1.3)
DIFF PNL FLD: NEGATIVE — SIGNIFICANT CHANGE UP
EGFR: 103 ML/MIN/1.73M2 — SIGNIFICANT CHANGE UP
EOSINOPHIL # BLD AUTO: 0.06 K/UL — SIGNIFICANT CHANGE UP (ref 0–0.5)
EOSINOPHIL NFR BLD AUTO: 1 % — SIGNIFICANT CHANGE UP (ref 0–6)
GLUCOSE BLDC GLUCOMTR-MCNC: 178 MG/DL — HIGH (ref 70–99)
GLUCOSE SERPL-MCNC: 243 MG/DL — HIGH (ref 70–99)
GLUCOSE UR QL: 250
HCT VFR BLD CALC: 43.4 % — SIGNIFICANT CHANGE UP (ref 34.5–45)
HGB BLD-MCNC: 14.5 G/DL — SIGNIFICANT CHANGE UP (ref 11.5–15.5)
IMM GRANULOCYTES NFR BLD AUTO: 0.3 % — SIGNIFICANT CHANGE UP (ref 0–1.5)
KETONES UR-MCNC: NEGATIVE — SIGNIFICANT CHANGE UP
LEUKOCYTE ESTERASE UR-ACNC: NEGATIVE — SIGNIFICANT CHANGE UP
LIDOCAIN IGE QN: 13 U/L — SIGNIFICANT CHANGE UP (ref 7–60)
LYMPHOCYTES # BLD AUTO: 1.38 K/UL — SIGNIFICANT CHANGE UP (ref 1–3.3)
LYMPHOCYTES # BLD AUTO: 23.5 % — SIGNIFICANT CHANGE UP (ref 13–44)
MCHC RBC-ENTMCNC: 28 PG — SIGNIFICANT CHANGE UP (ref 27–34)
MCHC RBC-ENTMCNC: 33.4 GM/DL — SIGNIFICANT CHANGE UP (ref 32–36)
MCV RBC AUTO: 83.9 FL — SIGNIFICANT CHANGE UP (ref 80–100)
MONOCYTES # BLD AUTO: 0.24 K/UL — SIGNIFICANT CHANGE UP (ref 0–0.9)
MONOCYTES NFR BLD AUTO: 4.1 % — SIGNIFICANT CHANGE UP (ref 2–14)
NEUTROPHILS # BLD AUTO: 4.14 K/UL — SIGNIFICANT CHANGE UP (ref 1.8–7.4)
NEUTROPHILS NFR BLD AUTO: 70.8 % — SIGNIFICANT CHANGE UP (ref 43–77)
NITRITE UR-MCNC: NEGATIVE — SIGNIFICANT CHANGE UP
NRBC # BLD: 0 /100 WBCS — SIGNIFICANT CHANGE UP (ref 0–0)
PH UR: 7 — SIGNIFICANT CHANGE UP (ref 5–8)
PLATELET # BLD AUTO: 233 K/UL — SIGNIFICANT CHANGE UP (ref 150–400)
POTASSIUM SERPL-MCNC: 5.1 MMOL/L — SIGNIFICANT CHANGE UP (ref 3.5–5.3)
POTASSIUM SERPL-SCNC: 5.1 MMOL/L — SIGNIFICANT CHANGE UP (ref 3.5–5.3)
PROT SERPL-MCNC: 7.2 G/DL — SIGNIFICANT CHANGE UP (ref 6–8.3)
PROT UR-MCNC: NEGATIVE MG/DL — SIGNIFICANT CHANGE UP
RBC # BLD: 5.17 M/UL — SIGNIFICANT CHANGE UP (ref 3.8–5.2)
RBC # FLD: 12.8 % — SIGNIFICANT CHANGE UP (ref 10.3–14.5)
SARS-COV-2 RNA SPEC QL NAA+PROBE: NEGATIVE — SIGNIFICANT CHANGE UP
SODIUM SERPL-SCNC: 139 MMOL/L — SIGNIFICANT CHANGE UP (ref 135–145)
SP GR SPEC: 1.01 — SIGNIFICANT CHANGE UP (ref 1–1.03)
TROPONIN T SERPL-MCNC: 0.01 NG/ML — SIGNIFICANT CHANGE UP (ref 0–0.01)
UROBILINOGEN FLD QL: 0.2 E.U./DL — SIGNIFICANT CHANGE UP
WBC # BLD: 5.86 K/UL — SIGNIFICANT CHANGE UP (ref 3.8–10.5)
WBC # FLD AUTO: 5.86 K/UL — SIGNIFICANT CHANGE UP (ref 3.8–10.5)

## 2022-04-15 PROCEDURE — G1004: CPT

## 2022-04-15 PROCEDURE — 74174 CTA ABD&PLVS W/CONTRAST: CPT | Mod: 26,MG

## 2022-04-15 PROCEDURE — 71275 CT ANGIOGRAPHY CHEST: CPT | Mod: 26,MG

## 2022-04-15 PROCEDURE — 93010 ELECTROCARDIOGRAM REPORT: CPT

## 2022-04-15 PROCEDURE — 70450 CT HEAD/BRAIN W/O DYE: CPT | Mod: 26,MG

## 2022-04-15 PROCEDURE — 99285 EMERGENCY DEPT VISIT HI MDM: CPT | Mod: FS,CS

## 2022-04-15 RX ORDER — ATORVASTATIN CALCIUM 80 MG/1
80 TABLET, FILM COATED ORAL AT BEDTIME
Refills: 0 | Status: DISCONTINUED | OUTPATIENT
Start: 2022-04-15 | End: 2022-04-20

## 2022-04-15 RX ORDER — GLUCAGON INJECTION, SOLUTION 0.5 MG/.1ML
1 INJECTION, SOLUTION SUBCUTANEOUS ONCE
Refills: 0 | Status: DISCONTINUED | OUTPATIENT
Start: 2022-04-15 | End: 2022-04-20

## 2022-04-15 RX ORDER — ONDANSETRON 8 MG/1
4 TABLET, FILM COATED ORAL ONCE
Refills: 0 | Status: COMPLETED | OUTPATIENT
Start: 2022-04-15 | End: 2022-04-15

## 2022-04-15 RX ORDER — LISINOPRIL 2.5 MG/1
40 TABLET ORAL EVERY 24 HOURS
Refills: 0 | Status: DISCONTINUED | OUTPATIENT
Start: 2022-04-16 | End: 2022-04-19

## 2022-04-15 RX ORDER — MORPHINE SULFATE 50 MG/1
4 CAPSULE, EXTENDED RELEASE ORAL ONCE
Refills: 0 | Status: DISCONTINUED | OUTPATIENT
Start: 2022-04-15 | End: 2022-04-15

## 2022-04-15 RX ORDER — DEXTROSE 50 % IN WATER 50 %
15 SYRINGE (ML) INTRAVENOUS ONCE
Refills: 0 | Status: DISCONTINUED | OUTPATIENT
Start: 2022-04-15 | End: 2022-04-20

## 2022-04-15 RX ORDER — MORPHINE SULFATE 50 MG/1
2 CAPSULE, EXTENDED RELEASE ORAL ONCE
Refills: 0 | Status: DISCONTINUED | OUTPATIENT
Start: 2022-04-15 | End: 2022-04-15

## 2022-04-15 RX ORDER — METOPROLOL TARTRATE 50 MG
25 TABLET ORAL EVERY 12 HOURS
Refills: 0 | Status: DISCONTINUED | OUTPATIENT
Start: 2022-04-16 | End: 2022-04-16

## 2022-04-15 RX ORDER — DEXTROSE 50 % IN WATER 50 %
25 SYRINGE (ML) INTRAVENOUS ONCE
Refills: 0 | Status: DISCONTINUED | OUTPATIENT
Start: 2022-04-15 | End: 2022-04-20

## 2022-04-15 RX ORDER — LEVOTHYROXINE SODIUM 125 MCG
150 TABLET ORAL DAILY
Refills: 0 | Status: DISCONTINUED | OUTPATIENT
Start: 2022-04-15 | End: 2022-04-16

## 2022-04-15 RX ORDER — INSULIN LISPRO 100/ML
VIAL (ML) SUBCUTANEOUS
Refills: 0 | Status: DISCONTINUED | OUTPATIENT
Start: 2022-04-15 | End: 2022-04-20

## 2022-04-15 RX ORDER — HEPARIN SODIUM 5000 [USP'U]/ML
5000 INJECTION INTRAVENOUS; SUBCUTANEOUS EVERY 8 HOURS
Refills: 0 | Status: DISCONTINUED | OUTPATIENT
Start: 2022-04-15 | End: 2022-04-18

## 2022-04-15 RX ORDER — AMLODIPINE BESYLATE 2.5 MG/1
1 TABLET ORAL
Qty: 0 | Refills: 0 | DISCHARGE

## 2022-04-15 RX ORDER — CLONAZEPAM 1 MG
1 TABLET ORAL
Qty: 0 | Refills: 0 | DISCHARGE

## 2022-04-15 RX ORDER — ASPIRIN/CALCIUM CARB/MAGNESIUM 324 MG
81 TABLET ORAL DAILY
Refills: 0 | Status: DISCONTINUED | OUTPATIENT
Start: 2022-04-16 | End: 2022-04-20

## 2022-04-15 RX ORDER — SODIUM CHLORIDE 9 MG/ML
1000 INJECTION, SOLUTION INTRAVENOUS
Refills: 0 | Status: DISCONTINUED | OUTPATIENT
Start: 2022-04-15 | End: 2022-04-20

## 2022-04-15 RX ORDER — INSULIN GLARGINE 100 [IU]/ML
10 INJECTION, SOLUTION SUBCUTANEOUS AT BEDTIME
Refills: 0 | Status: DISCONTINUED | OUTPATIENT
Start: 2022-04-15 | End: 2022-04-16

## 2022-04-15 RX ORDER — ASPIRIN/CALCIUM CARB/MAGNESIUM 324 MG
325 TABLET ORAL ONCE
Refills: 0 | Status: COMPLETED | OUTPATIENT
Start: 2022-04-15 | End: 2022-04-15

## 2022-04-15 RX ORDER — AMLODIPINE BESYLATE 2.5 MG/1
10 TABLET ORAL EVERY 24 HOURS
Refills: 0 | Status: DISCONTINUED | OUTPATIENT
Start: 2022-04-16 | End: 2022-04-19

## 2022-04-15 RX ORDER — PANTOPRAZOLE SODIUM 20 MG/1
40 TABLET, DELAYED RELEASE ORAL
Refills: 0 | Status: DISCONTINUED | OUTPATIENT
Start: 2022-04-15 | End: 2022-04-20

## 2022-04-15 RX ADMIN — MORPHINE SULFATE 4 MILLIGRAM(S): 50 CAPSULE, EXTENDED RELEASE ORAL at 19:16

## 2022-04-15 RX ADMIN — Medication 325 MILLIGRAM(S): at 22:16

## 2022-04-15 RX ADMIN — MORPHINE SULFATE 2 MILLIGRAM(S): 50 CAPSULE, EXTENDED RELEASE ORAL at 22:17

## 2022-04-15 RX ADMIN — ATORVASTATIN CALCIUM 80 MILLIGRAM(S): 80 TABLET, FILM COATED ORAL at 23:35

## 2022-04-15 RX ADMIN — ONDANSETRON 4 MILLIGRAM(S): 8 TABLET, FILM COATED ORAL at 23:36

## 2022-04-15 RX ADMIN — HEPARIN SODIUM 5000 UNIT(S): 5000 INJECTION INTRAVENOUS; SUBCUTANEOUS at 23:35

## 2022-04-15 RX ADMIN — MORPHINE SULFATE 4 MILLIGRAM(S): 50 CAPSULE, EXTENDED RELEASE ORAL at 20:40

## 2022-04-15 NOTE — ED ADULT NURSE REASSESSMENT NOTE - NS ED NURSE REASSESS COMMENT FT1
ED Charge Note - Informed that patient case had been attempted to be presented and endorsed to in-patient nursing. Transport present in the ED. Informed that bedside report will be attempted if no additional answerback or call back within ten minutes of transport arrival.

## 2022-04-15 NOTE — ED PROVIDER NOTE - CLINICAL SUMMARY MEDICAL DECISION MAKING FREE TEXT BOX
Pt is a 67F with chest pain. Will check labs, CXR. CTA chest/abd/pel done to r/o dissection.   Pt admitted for r/o ACS.

## 2022-04-15 NOTE — H&P ADULT - NSICDXPASTMEDICALHX_GEN_ALL_CORE_FT
PAST MEDICAL HISTORY:  Anxiety     Diabetes     GERD (gastroesophageal reflux disease)     HLD (hyperlipidemia)     Hypertension     Hypothyroid     Thyroid cancer     Vertigo

## 2022-04-15 NOTE — ED PROVIDER NOTE - PHYSICAL EXAMINATION
CONSTITUTIONAL: NAD   SKIN: Normal color and turgor.    HEAD: NC/AT.  EYES: Conjunctiva clear. EOMI. PERRL.    ENT: Airway clear. Normal voice.   RESPIRATORY:  Normal work of breathing. Lungs CTAB.  CARDIOVASCULAR:  RRR, S1S2. No M/R/G.  No carotid bruits.  GI:  Abdomen soft, nontender.    MSK: Neck supple.  No lower extremity edema or calf tenderness.  No joint swelling or ROM limitation. Strong symmetrically radial DP PT pulses B/L.  NEURO: Alert and oriented; CN II-XII grossly intact. Speech clear. 5/5 strength in all extremities.  Good balance. Steady gait. Sensation intact b/l UE. Reports abnormal sensation to light touch in RLE compared to the left. Motor strength 5/5 bl ue  5/5 b/l le but right leg slightly weaker then left.

## 2022-04-15 NOTE — H&P ADULT - ASSESSMENT
Patient is a 68 yo F with PMH DM, HTN, HLD, depression who presents with CC of chest pain and back pain. Admitted for further workup and monitoring to cardiac telemetry given extensive family hx and other risk factors.

## 2022-04-15 NOTE — H&P ADULT - PROBLEM SELECTOR PLAN 5
-c/w paroxetine 40 mg  -patient has clonazepam 2 mg listed on home meds unclear frequency (I Stop in AM)-->monitor for signs of withdrawal

## 2022-04-15 NOTE — H&P ADULT - PROBLEM SELECTOR PROBLEM 7
OK, but if her pain isn't better anything worrisome, she needs to be seen or go to ER. Nutrition, metabolism, and development symptoms Hypothyroid

## 2022-04-15 NOTE — H&P ADULT - PROBLEM SELECTOR PLAN 8
no fluids  replete lytes as needed  protonix  hep sub Q  DASH TLC carb consistent   FULL CODE    5 Lach

## 2022-04-15 NOTE — ED ADULT NURSE NOTE - OBJECTIVE STATEMENT
pt presents to ed for chest pain on the left side. pt also complains of lower back pain due to a pinched nerve. pt states she has been taking Motrin for it but the last 4 days its been worse. pt she is having right sided leg numbness that has gotten worse in the last two days. pt presents to ed for chest pain on the left side. pt also complains of upper back pain that migrates to the lower back. pt states she has a pinched nerve. pt states she has been taking Motrin for it but the last 4 days its been worse. pt she is having right sided leg numbness that has gotten worse in the last two days.

## 2022-04-15 NOTE — H&P ADULT - HISTORY OF PRESENT ILLNESS
Patient is a 66 yo F with PMH DM, HTN, HLD, depression who presents with CC of chest pain and back pain. Pain in chest started yesterday but was intermittent and she has chronic pain in her back generally. However, this morning she woke up and felt her whole right leg was numb. Her back pain and chest pain also increased in severity and became more constant. Chest pain was on both the left and right side (rated 6/10), while back pain was mid back and radiated down her spine (rated 9/10.). Patient took motrin yesterday which did not really help. Today she just took her BP meds and aspirin but nothing else. Daughter Mariposa brought patient in because the pain was not going away. Patient felt palpitations and nausea. States numbness in her leg went away with pain medication she got in ED. Denies SOB, vomiting, fever, chills, falls, dysuria, frequency, urgency vision changes, HA.     SHX- thyroidectomy, hysterectomy, carpal tunnel release,   Allergies- neurontin (whole body twitches?)  Family hx-Son  of MI (age 45), eldest daugher (MI at at 40)  Social hx-former smoker (quit 6 yrs ago, smoked 1.5 ppd), denies alcohol or drug use, lives with daughter, retired, ambulates without assitance.    ED course: VSS except tachycardia  troponin (-), other labs wnl  CT chest/abdom-No aortic dissection or aneurysm. No acute abnormality.  CT head (-)   Patient is a 68 yo F with PMH DM, HTN, HLD, depression who presents with CC of chest pain and back pain. Pain in chest started yesterday but was intermittent and she has chronic pain in her back generally. However, this morning she woke up and felt her whole right leg was numb. Her back pain and chest pain also increased in severity and became more constant. Chest pain was on both the left and right side (rated 6/10), while back pain was mid back and radiated down her spine (rated 9/10.). Patient took motrin yesterday which did not really help. Today she just took her BP meds and aspirin but nothing else. Daughter Mariposa brought patient in because the pain was not going away. Patient felt palpitations and nausea. States numbness in her leg went away with pain medication she got in ED. Denies SOB, vomiting, fever, chills, falls, dysuria, frequency, urgency vision changes, HA.     SHX- thyroidectomy, hysterectomy, carpal tunnel release,   Allergies- neurontin (whole body twitches?)  Family hx-Son  of MI (age 45), eldest daugher (MI at at 40)  Social hx-former smoker (quit 6 yrs ago, smoked 1.5 ppd), denies alcohol or drug use, lives with daughter, retired, ambulates without assitance.    ED course: VSS except tachycardia  troponin (-), other labs wnl  CT chest/abdom-No aortic dissection or aneurysm. No acute abnormality.  CT head (-), ekg NSR

## 2022-04-15 NOTE — H&P ADULT - PROBLEM SELECTOR PLAN 1
#RULE OUT ACS    Patient presenting with chest and back pain, with significant family hx (MI in daughter and son at 40 and 45 respectively) and risk factors (smoking hx, HTN, DM, HLD). EKG showing sinus tachycardia with no overt signs of ischemia. Troponin negative. CTA chest/abdom pelvis negative for acute aortic dissection or any other pathology. Given aspirin 325 mg in ED and morphine.   -c/w aspirin and lipitor  -f/u echo  -continue to monitor on telemetry  -CCTA after weekend to assess for any obstruction and need for possible intervention #RULE OUT ACS    Patient presenting with chest and back pain, with significant family hx (MI in daughter and son at 40 and 45 respectively) and risk factors (smoking hx, HTN, DM, HLD). EKG showing NSR with no overt signs of ischemia. Troponin negative. CTA chest/abdom pelvis negative for acute aortic dissection or any other pathology. Given aspirin 325 mg in ED and morphine.   -c/w aspirin and lipitor  -f/u echo  -continue to monitor on telemetry  -CCTA after weekend to assess for any obstruction and need for possible intervention

## 2022-04-15 NOTE — H&P ADULT - PROBLEM SELECTOR PLAN 2
Patient noted to take tresiba and insulin aspart on home meds. However, no doses listed.  -med rec in AM  -weight based lantus for now pending med rec-->at 0.3 mg/kg=roughly 10 units lantus QHS  -mod ISS

## 2022-04-15 NOTE — ED PROVIDER NOTE - NS ED ATTENDING STATEMENT MOD
This was a shared visit with the [facility]. I reviewed and verified the documentation and independently performed the documented:

## 2022-04-15 NOTE — H&P ADULT - PROBLEM SELECTOR PLAN 3
official med rec in am.  -c/w home norvasc 10 mg  -c/w home lisinopril 40 mg  -c/w lopressor 25 mg BID (verify dose)

## 2022-04-15 NOTE — H&P ADULT - NSHPPHYSICALEXAM_GEN_ALL_CORE
.  VITAL SIGNS:  T(C): 36.7 (04-15-22 @ 18:01), Max: 36.7 (04-15-22 @ 18:01)  T(F): 98 (04-15-22 @ 18:01), Max: 98 (04-15-22 @ 18:01)  HR: 84 (04-15-22 @ 21:00) (84 - 105)  BP: 136/65 (04-15-22 @ 21:00) (136/65 - 185/78)  BP(mean): --  RR: 18 (04-15-22 @ 21:00) (18 - 18)  SpO2: 97% (04-15-22 @ 21:00) (97% - 98%)  Wt(kg): --    PHYSICAL EXAM:    Constitutional: WDWN resting comfortably in bed; NAD, obese   Head: NC/AT  Eyes: PERRL, EOMI, anicteric sclera  ENT: no oropharyngeal erythema or exudates; MMM  Neck: supple; no JVD   Respiratory: CTA B/L; no W/R/R, no retractions  Cardiac: +S1/S2; tachycardic ; no M/R/G;   Gastrointestinal: soft, slight epigastric tenderness; no rebound or guarding;   Extremities: WWP, no clubbing or cyanosis; no peripheral edema  Musculoskeletal: NROM x4; no joint swelling, tenderness or erythema  Vascular: 2+ radial, femoral, DP/PT pulses B/L  Dermatologic: skin warm, dry and intact; no rashes, wounds, or scars  Lymphatic: no submandibular or cervical LAD  Neurologic: AAOx3; CNII-XII grossly intact; no focal deficits  Psychiatric: affect and characteristics of appearance, verbalizations, behaviors are appropriate

## 2022-04-15 NOTE — H&P ADULT - NSHPLABSRESULTS_GEN_ALL_CORE
.  LABS:                         14.5   5.86  )-----------( 233      ( 15 Apr 2022 19:05 )             43.4     04-15    139  |  102  |  8   ----------------------------<  243<H>  5.1   |  24  |  0.50    Ca    9.5      15 Apr 2022 19:05    TPro  7.2  /  Alb  4.6  /  TBili  0.4  /  DBili  x   /  AST  19  /  ALT  36  /  AlkPhos  65  04-15      Urinalysis Basic - ( 15 Apr 2022 21:00 )    Color: Yellow / Appearance: Clear / S.010 / pH: x  Gluc: x / Ketone: NEGATIVE  / Bili: Negative / Urobili: 0.2 E.U./dL   Blood: x / Protein: NEGATIVE mg/dL / Nitrite: NEGATIVE   Leuk Esterase: NEGATIVE / RBC: x / WBC x   Sq Epi: x / Non Sq Epi: x / Bacteria: x      CARDIAC MARKERS ( 15 Apr 2022 19:05 )  x     / 0.01 ng/mL / 157 U/L / x     / 1.2 ng/mL            RADIOLOGY, EKG & ADDITIONAL TESTS: Reviewed.

## 2022-04-16 DIAGNOSIS — R07.9 CHEST PAIN, UNSPECIFIED: ICD-10-CM

## 2022-04-16 LAB
A1C WITH ESTIMATED AVERAGE GLUCOSE RESULT: 10.4 % — HIGH (ref 4–5.6)
ANION GAP SERPL CALC-SCNC: 10 MMOL/L — SIGNIFICANT CHANGE UP (ref 5–17)
BASOPHILS # BLD AUTO: 0.01 K/UL — SIGNIFICANT CHANGE UP (ref 0–0.2)
BASOPHILS NFR BLD AUTO: 0.1 % — SIGNIFICANT CHANGE UP (ref 0–2)
BLD GP AB SCN SERPL QL: NEGATIVE — SIGNIFICANT CHANGE UP
BUN SERPL-MCNC: 9 MG/DL — SIGNIFICANT CHANGE UP (ref 7–23)
CALCIUM SERPL-MCNC: 9.2 MG/DL — SIGNIFICANT CHANGE UP (ref 8.4–10.5)
CHLORIDE SERPL-SCNC: 101 MMOL/L — SIGNIFICANT CHANGE UP (ref 96–108)
CK MB CFR SERPL CALC: <1 NG/ML — SIGNIFICANT CHANGE UP (ref 0–6.7)
CK SERPL-CCNC: 121 U/L — SIGNIFICANT CHANGE UP (ref 25–170)
CO2 SERPL-SCNC: 25 MMOL/L — SIGNIFICANT CHANGE UP (ref 22–31)
CREAT SERPL-MCNC: 0.57 MG/DL — SIGNIFICANT CHANGE UP (ref 0.5–1.3)
CRP SERPL-MCNC: <3 MG/L — SIGNIFICANT CHANGE UP (ref 0–4)
EGFR: 100 ML/MIN/1.73M2 — SIGNIFICANT CHANGE UP
EOSINOPHIL # BLD AUTO: 0.04 K/UL — SIGNIFICANT CHANGE UP (ref 0–0.5)
EOSINOPHIL NFR BLD AUTO: 0.6 % — SIGNIFICANT CHANGE UP (ref 0–6)
ERYTHROCYTE [SEDIMENTATION RATE] IN BLOOD: 2 MM/HR — SIGNIFICANT CHANGE UP
ESTIMATED AVERAGE GLUCOSE: 252 MG/DL — HIGH (ref 68–114)
GLUCOSE BLDC GLUCOMTR-MCNC: 202 MG/DL — HIGH (ref 70–99)
GLUCOSE BLDC GLUCOMTR-MCNC: 221 MG/DL — HIGH (ref 70–99)
GLUCOSE BLDC GLUCOMTR-MCNC: 225 MG/DL — HIGH (ref 70–99)
GLUCOSE BLDC GLUCOMTR-MCNC: 228 MG/DL — HIGH (ref 70–99)
GLUCOSE BLDC GLUCOMTR-MCNC: 230 MG/DL — HIGH (ref 70–99)
GLUCOSE SERPL-MCNC: 257 MG/DL — HIGH (ref 70–99)
HCT VFR BLD CALC: 40 % — SIGNIFICANT CHANGE UP (ref 34.5–45)
HCV AB S/CO SERPL IA: 0.04 S/CO — SIGNIFICANT CHANGE UP
HCV AB SERPL-IMP: SIGNIFICANT CHANGE UP
HGB BLD-MCNC: 13.5 G/DL — SIGNIFICANT CHANGE UP (ref 11.5–15.5)
IMM GRANULOCYTES NFR BLD AUTO: 0.1 % — SIGNIFICANT CHANGE UP (ref 0–1.5)
LYMPHOCYTES # BLD AUTO: 1.86 K/UL — SIGNIFICANT CHANGE UP (ref 1–3.3)
LYMPHOCYTES # BLD AUTO: 27.4 % — SIGNIFICANT CHANGE UP (ref 13–44)
MAGNESIUM SERPL-MCNC: 2 MG/DL — SIGNIFICANT CHANGE UP (ref 1.6–2.6)
MCHC RBC-ENTMCNC: 28.6 PG — SIGNIFICANT CHANGE UP (ref 27–34)
MCHC RBC-ENTMCNC: 33.8 GM/DL — SIGNIFICANT CHANGE UP (ref 32–36)
MCV RBC AUTO: 84.7 FL — SIGNIFICANT CHANGE UP (ref 80–100)
MONOCYTES # BLD AUTO: 0.36 K/UL — SIGNIFICANT CHANGE UP (ref 0–0.9)
MONOCYTES NFR BLD AUTO: 5.3 % — SIGNIFICANT CHANGE UP (ref 2–14)
NEUTROPHILS # BLD AUTO: 4.5 K/UL — SIGNIFICANT CHANGE UP (ref 1.8–7.4)
NEUTROPHILS NFR BLD AUTO: 66.5 % — SIGNIFICANT CHANGE UP (ref 43–77)
NRBC # BLD: 0 /100 WBCS — SIGNIFICANT CHANGE UP (ref 0–0)
PLATELET # BLD AUTO: 249 K/UL — SIGNIFICANT CHANGE UP (ref 150–400)
POTASSIUM SERPL-MCNC: 4.5 MMOL/L — SIGNIFICANT CHANGE UP (ref 3.5–5.3)
POTASSIUM SERPL-SCNC: 4.5 MMOL/L — SIGNIFICANT CHANGE UP (ref 3.5–5.3)
RBC # BLD: 4.72 M/UL — SIGNIFICANT CHANGE UP (ref 3.8–5.2)
RBC # FLD: 13.1 % — SIGNIFICANT CHANGE UP (ref 10.3–14.5)
RH IG SCN BLD-IMP: POSITIVE — SIGNIFICANT CHANGE UP
SODIUM SERPL-SCNC: 136 MMOL/L — SIGNIFICANT CHANGE UP (ref 135–145)
TROPONIN T SERPL-MCNC: 0.01 NG/ML — SIGNIFICANT CHANGE UP (ref 0–0.01)
TROPONIN T SERPL-MCNC: <0.01 NG/ML — SIGNIFICANT CHANGE UP (ref 0–0.01)
WBC # BLD: 6.78 K/UL — SIGNIFICANT CHANGE UP (ref 3.8–10.5)
WBC # FLD AUTO: 6.78 K/UL — SIGNIFICANT CHANGE UP (ref 3.8–10.5)

## 2022-04-16 PROCEDURE — 99223 1ST HOSP IP/OBS HIGH 75: CPT

## 2022-04-16 PROCEDURE — 93306 TTE W/DOPPLER COMPLETE: CPT | Mod: 26

## 2022-04-16 PROCEDURE — 99221 1ST HOSP IP/OBS SF/LOW 40: CPT | Mod: GC

## 2022-04-16 PROCEDURE — 93010 ELECTROCARDIOGRAM REPORT: CPT

## 2022-04-16 RX ORDER — LEVOTHYROXINE SODIUM 125 MCG
125 TABLET ORAL DAILY
Refills: 0 | Status: DISCONTINUED | OUTPATIENT
Start: 2022-04-17 | End: 2022-04-20

## 2022-04-16 RX ORDER — METOPROLOL TARTRATE 50 MG
1 TABLET ORAL
Qty: 0 | Refills: 0 | DISCHARGE

## 2022-04-16 RX ORDER — ISOSORBIDE MONONITRATE 60 MG/1
30 TABLET, EXTENDED RELEASE ORAL DAILY
Refills: 0 | Status: DISCONTINUED | OUTPATIENT
Start: 2022-04-16 | End: 2022-04-20

## 2022-04-16 RX ORDER — INSULIN GLARGINE 100 [IU]/ML
37 INJECTION, SOLUTION SUBCUTANEOUS AT BEDTIME
Refills: 0 | Status: DISCONTINUED | OUTPATIENT
Start: 2022-04-16 | End: 2022-04-19

## 2022-04-16 RX ORDER — METOPROLOL TARTRATE 50 MG
25 TABLET ORAL
Qty: 0 | Refills: 0 | DISCHARGE

## 2022-04-16 RX ORDER — METOPROLOL TARTRATE 50 MG
25 TABLET ORAL
Refills: 0 | Status: DISCONTINUED | OUTPATIENT
Start: 2022-04-16 | End: 2022-04-19

## 2022-04-16 RX ORDER — INSULIN GLARGINE 100 [IU]/ML
50 INJECTION, SOLUTION SUBCUTANEOUS
Qty: 0 | Refills: 0 | DISCHARGE

## 2022-04-16 RX ORDER — METOPROLOL TARTRATE 50 MG
50 TABLET ORAL
Refills: 0 | Status: DISCONTINUED | OUTPATIENT
Start: 2022-04-16 | End: 2022-04-16

## 2022-04-16 RX ORDER — INSULIN LISPRO 100/ML
6 VIAL (ML) SUBCUTANEOUS
Refills: 0 | Status: DISCONTINUED | OUTPATIENT
Start: 2022-04-16 | End: 2022-04-20

## 2022-04-16 RX ADMIN — HEPARIN SODIUM 5000 UNIT(S): 5000 INJECTION INTRAVENOUS; SUBCUTANEOUS at 06:02

## 2022-04-16 RX ADMIN — MORPHINE SULFATE 2 MILLIGRAM(S): 50 CAPSULE, EXTENDED RELEASE ORAL at 00:01

## 2022-04-16 RX ADMIN — Medication 40 MILLIGRAM(S): at 12:15

## 2022-04-16 RX ADMIN — ISOSORBIDE MONONITRATE 30 MILLIGRAM(S): 60 TABLET, EXTENDED RELEASE ORAL at 22:48

## 2022-04-16 RX ADMIN — INSULIN GLARGINE 10 UNIT(S): 100 INJECTION, SOLUTION SUBCUTANEOUS at 01:17

## 2022-04-16 RX ADMIN — AMLODIPINE BESYLATE 10 MILLIGRAM(S): 2.5 TABLET ORAL at 09:44

## 2022-04-16 RX ADMIN — PANTOPRAZOLE SODIUM 40 MILLIGRAM(S): 20 TABLET, DELAYED RELEASE ORAL at 06:50

## 2022-04-16 RX ADMIN — Medication 4: at 17:13

## 2022-04-16 RX ADMIN — Medication 25 MILLIGRAM(S): at 22:04

## 2022-04-16 RX ADMIN — INSULIN GLARGINE 37 UNIT(S): 100 INJECTION, SOLUTION SUBCUTANEOUS at 22:05

## 2022-04-16 RX ADMIN — HEPARIN SODIUM 5000 UNIT(S): 5000 INJECTION INTRAVENOUS; SUBCUTANEOUS at 22:48

## 2022-04-16 RX ADMIN — Medication 150 MICROGRAM(S): at 06:02

## 2022-04-16 RX ADMIN — LISINOPRIL 40 MILLIGRAM(S): 2.5 TABLET ORAL at 06:02

## 2022-04-16 RX ADMIN — ATORVASTATIN CALCIUM 80 MILLIGRAM(S): 80 TABLET, FILM COATED ORAL at 22:06

## 2022-04-16 RX ADMIN — Medication 81 MILLIGRAM(S): at 12:15

## 2022-04-16 RX ADMIN — Medication 25 MILLIGRAM(S): at 09:44

## 2022-04-16 RX ADMIN — Medication 4: at 12:15

## 2022-04-16 RX ADMIN — Medication 4: at 06:51

## 2022-04-16 RX ADMIN — HEPARIN SODIUM 5000 UNIT(S): 5000 INJECTION INTRAVENOUS; SUBCUTANEOUS at 13:44

## 2022-04-16 NOTE — CONSULT NOTE ADULT - ATTENDING COMMENTS
Pt seen with fellow at bedside.  Granddaughter also present at bedside.  Diabetes diagnosed about 10 years ago, on insulin for 7 years.  Everyone in her family has diabetes, including son, daughters and grandson.  She did not have diabetes during pregnancy.  Home regimen:  glargine 52 units and Novolog 10 units; she admits she is not compliant with Novolog.  She eats little during the day and then eats at night and forgets to take Novolog.   Morning sugars are usually in 300s range, but can be < 150 when she doesn't eat during the night.   She has occasional neuropathy symptoms, no h/o laser or injections to eyes.  She has blurry vision, dry mouth, polyuria, nocturia. A1c is 10%.  Start basal/bolus insulin and will adjust doses.   Explained to pt the need to take rapid acting insulin for meals to control post meal sugars and need to adjust dose, based on anticipated meal.  Agree with glargine dose (current dosing is about 0.5 unit/kg/day for basal).    She has been on Synthroid for past 7 years and on her current dose (150mcg) for past 4 years.  She comes in with palpitations, shaking, insomnia (could not sleep past 2 days).   TSH is suppressed now, so dose of Synthroid is probably too much for her and may be contributing to her current symptoms.  Recommend reducing thyroxine dose to 125mcg/day.

## 2022-04-16 NOTE — CONSULT NOTE ADULT - SUBJECTIVE AND OBJECTIVE BOX
HPI: 68 yo F with PMH DM, HTN, HLD, depression who presents with CC of chest pain and back pain. Pain in chest started yesterday but was intermittent and she has chronic pain in her back generally. However, this morning she woke up and felt her whole right leg was numb. Her back pain and chest pain also increased in severity and became more constant. Chest pain was on both the left and right side (rated 6/10), while back pain was mid back and radiated down her spine (rated 9/10.). Patient took motrin yesterday which did not really help. Today she just took her BP meds and aspirin but nothing else. Daughter Mariposa brought patient in because the pain was not going away. Patient felt palpitations and nausea. States numbness in her leg went away with pain medication she got in ED. Denies SOB, vomiting, fever, chills, falls, dysuria, frequency, urgency vision changes, HA.     SHX- thyroidectomy, hysterectomy, carpal tunnel release,   Allergies- neurontin (whole body twitches?)  Family hx-Son  of MI (age 45), eldest daugher (MI at at 40)  Social hx-former smoker (quit 6 yrs ago, smoked 1.5 ppd), denies alcohol or drug use, lives with daughter, retired, ambulates without assistance    ED course: VSS except tachycardia  troponin (-), other labs wnl  CT chest/abdom-No aortic dissection or aneurysm. No acute abnormality.  CT head (-), ekg NSR    Dx:  Hx and duration of insulin:  Current Therapy:  Hx of hypoglycemia  Hx of DKA/HHS?    Home FSG:  Fasting  Lunch  Dinner  Bed    Hx of other regimens  Complications:  Outpatient Endo:    PMH & Surgical Hx:      FH:  DM:  Thyroid:  Autoimmune:  Other:    SH:  Smoking  Etoh:  Recreational Drugs:  Social Life:    Current Meds:  amLODIPine   Tablet 10 milliGRAM(s) Oral every 24 hours  aspirin enteric coated 81 milliGRAM(s) Oral daily  atorvastatin 80 milliGRAM(s) Oral at bedtime  dextrose 5%. 1000 milliLiter(s) IV Continuous <Continuous>  dextrose 50% Injectable 25 Gram(s) IV Push once  dextrose Oral Gel 15 Gram(s) Oral once PRN  glucagon  Injectable 1 milliGRAM(s) IntraMuscular once  heparin   Injectable 5000 Unit(s) SubCutaneous every 8 hours  insulin glargine Injectable (LANTUS) 37 Unit(s) SubCutaneous at bedtime  insulin lispro (ADMELOG) corrective regimen sliding scale   SubCutaneous three times a day before meals  insulin lispro Injectable (ADMELOG) 6 Unit(s) SubCutaneous three times a day before meals  isosorbide   mononitrate ER Tablet (IMDUR) 30 milliGRAM(s) Oral daily  levothyroxine 150 MICROGram(s) Oral daily  lisinopril 40 milliGRAM(s) Oral every 24 hours  metoprolol tartrate 25 milliGRAM(s) Oral two times a day  pantoprazole    Tablet 40 milliGRAM(s) Oral before breakfast  PARoxetine 40 milliGRAM(s) Oral daily      Allergies:  Neurontin (Short breath)      ROS:  Denies the following except as indicated.    General: weight loss/weight gain, decreased appetite, fatigue  Eyes: Blurry vision, double vision, visual changes  ENT: Throat pain, changes in voice,   CV: palpitations, SOB, CP, cough  GI: NVD, difficulty swallowing, abdominal pain  : polyuria, dysuria  Endo: abnormal menses, temperature intolerance, decreased libido  MSK: weakness, joint pain  Skin: rash, dryness, diaphoresis  Heme: Easy bruising,bleeding  Neuro: HA, dizziness, lightheadedness, numbness tingling  Psych: Anxiety, Depression    Vital Signs Last 24 Hrs  T(C): 36.7 (2022 09:51), Max: 36.7 (15 Apr 2022 18:01)  T(F): 98 (2022 09:51), Max: 98 (15 Apr 2022 18:01)  HR: 72 (2022 12:24) (67 - 105)  BP: 106/52 (2022 12:24) (106/52 - 185/78)  BP(mean): --  RR: 16 (2022 12:24) (16 - 18)  SpO2: 97% (2022 12:24) (96% - 98%)  Height (cm): 157.5 (-15 @ 18:01)  Weight (kg): 70.6 (-16 @ 12:24)  BMI (kg/m2): 28.5 (-16 @ 12:24)      Constitutional: NAD.   HEENT: NCAT, MMM, OP clear, EOMI, , no proptosis or lid retraction  Neck: no thyromegaly or palpable thyroid nodules   Respiratory: lungs CTAB.  Cardiovascular: regular rhythm, normal S1 and S2, no audible murmurs, no peripheral edema  GI: soft, NT/ND, no masses/HSM appreciated.  Neurology: no tremors, DTR 2+  Skin: no visible rashes/lesions  Psychiatric: AAO x 3, normal affect/mood.  Ext: radial pulses intact, DP pulses intact, extremities warm, no cyanosis, clubbing or edema.       LABS:                        13.5   6.78  )-----------( 249      ( 2022 06:55 )             40.0     04-16    136  |  101  |  9   ----------------------------<  257<H>  4.5   |  25  |  0.57    Ca    9.2      2022 06:55  Mg     2.0     -    TPro  7.2  /  Alb  4.6  /  TBili  0.4  /  DBili  x   /  AST  19  /  ALT  36  /  AlkPhos  65  -15      Urinalysis Basic - ( 15 Apr 2022 21:00 )    Color: Yellow / Appearance: Clear / S.010 / pH: x  Gluc: x / Ketone: NEGATIVE  / Bili: Negative / Urobili: 0.2 E.U./dL   Blood: x / Protein: NEGATIVE mg/dL / Nitrite: NEGATIVE   Leuk Esterase: NEGATIVE / RBC: x / WBC x   Sq Epi: x / Non Sq Epi: x / Bacteria: x        Thyroid Stimulating Hormone, Serum: <0.118 (04-15 @ 19:05)      RADIOLOGY & ADDITIONAL STUDIES:  CAPILLARY BLOOD GLUCOSE      POCT Blood Glucose.: 202 mg/dL (2022 12:03)  POCT Blood Glucose.: 228 mg/dL (2022 06:39)  POCT Blood Glucose.: 225 mg/dL (2022 01:16)  POCT Blood Glucose.: 178 mg/dL (15 Apr 2022 23:28)        A/P:67y Female    1.  DM type 2  Please continue lantus   37    units at night.    Please continue lispro   6   units before each meal.    Please continue lispro moderate dose sliding scale four times daily with meals and at bedtime    Pt's fingerstick glucose goal is 100-180    2. Hypothyroidism-      Will continue to monitor     For discharge, pt can continue    Pt can follow up at discharge with [facility] by calling  to make an appointment.   Will discuss case with     and update primary team HPI: 66 yo F with PMH DM, HTN, HLD, depression who presents with CC of chest pain and back pain. Pain in chest started yesterday but was intermittent and she has chronic pain in her back generally. However, this morning she woke up and felt her whole right leg was numb. Her back pain and chest pain also increased in severity and became more constant. Chest pain was on both the left and right side (rated 6/10), while back pain was mid back and radiated down her spine (rated 9/10.). Patient took motrin yesterday which did not really help. Today she just took her BP meds and aspirin but nothing else. Daughter Mariposa brought patient in because the pain was not going away. Patient felt palpitations and nausea. States numbness in her leg went away with pain medication she got in ED. Denies SOB, vomiting, fever, chills, falls, dysuria, frequency, urgency vision changes, HA.     SHX- thyroidectomy, hysterectomy, carpal tunnel release,   Allergies- neurontin (whole body twitches?)  Family hx-Son  of MI (age 45), eldest daugher (MI at at 40)  Social hx-former smoker (quit 6 yrs ago, smoked 1.5 ppd), denies alcohol or drug use, lives with daughter, retired, ambulates without assistance    ED course: VSS except tachycardia  troponin (-), other labs wnl  CT chest/abdom-No aortic dissection or aneurysm. No acute abnormality.  CT head (-), ekg NSR    Dx: Patient was diagnosed with type 2 Diabetes over 10 years ago. She reports being initiated on Metformin but was soon later transitioned to insulin.   Current Therapy: Lantus 50u QHS, Novolog 8-10units AC  She reports checking fingersticks 3x daily. Has been running in the 300s in the morning and at night. She admits to night time eating. She has a sandwich and glucerna usually. She does not give insulin for these night time meals.     Hx of other regimens  Complications: reports numbness and tingling in the feet, denies retinopathy  Outpatient Endo: had one in [place] but needs new physician in [place]. Unsure when last labwork was checked    Hypothyroidism:  Reports having hypothyroidism for maybe 7 years, can't remember exactly. Apparently has hx of thyroidectomy. HAs been on same dose 150mcg levothyroxine for 4 years.     PMH & Surgical Hx: as per HPI      FH:  DM: mom, daughter, son, nephew   Thyroid:unknown     SH:  Smoking- denies  Etoh: denies       Current Meds:  amLODIPine   Tablet 10 milliGRAM(s) Oral every 24 hours  aspirin enteric coated 81 milliGRAM(s) Oral daily  atorvastatin 80 milliGRAM(s) Oral at bedtime  dextrose 5%. 1000 milliLiter(s) IV Continuous <Continuous>  dextrose 50% Injectable 25 Gram(s) IV Push once  dextrose Oral Gel 15 Gram(s) Oral once PRN  glucagon  Injectable 1 milliGRAM(s) IntraMuscular once  heparin   Injectable 5000 Unit(s) SubCutaneous every 8 hours  insulin glargine Injectable (LANTUS) 37 Unit(s) SubCutaneous at bedtime  insulin lispro (ADMELOG) corrective regimen sliding scale   SubCutaneous three times a day before meals  insulin lispro Injectable (ADMELOG) 6 Unit(s) SubCutaneous three times a day before meals  isosorbide   mononitrate ER Tablet (IMDUR) 30 milliGRAM(s) Oral daily  levothyroxine 150 MICROGram(s) Oral daily  lisinopril 40 milliGRAM(s) Oral every 24 hours  metoprolol tartrate 25 milliGRAM(s) Oral two times a day  pantoprazole    Tablet 40 milliGRAM(s) Oral before breakfast  PARoxetine 40 milliGRAM(s) Oral daily      Allergies:  Neurontin (Short breath)      ROS:  Denies the following except as indicated.    General: weight loss/weight gain, decreased appetite, fatigue  Eyes: +Blurry vision, double vision, visual changes  ENT: Throat pain, changes in voice,   CV: palpitations, SOB, CP, cough  GI: NVD, difficulty swallowing, abdominal pain  : +polyuria, dysuria  Endo:  temperature intolerance  MSK: weakness, joint pain  Skin: rash, dryness, diaphoresis  Heme: Easy bruising,bleeding  Neuro: HA, dizziness, lightheadedness, +numbness tingling  Psych: Anxiety, Depression    Vital Signs Last 24 Hrs  T(C): 36.7 (2022 09:51), Max: 36.7 (15 Apr 2022 18:01)  T(F): 98 (2022 09:51), Max: 98 (15 Apr 2022 18:01)  HR: 72 (2022 12:24) (67 - 105)  BP: 106/52 (2022 12:24) (106/52 - 185/78)  BP(mean): --  RR: 16 (2022 12:24) (16 - 18)  SpO2: 97% (2022 12:24) (96% - 98%)  Height (cm): 157.5 (04-15 @ 18:01)  Weight (kg): 70.6 ( @ 12:24)  BMI (kg/m2): 28.5 ( @ 12:24)      Constitutional: NAD.   HEENT: NCAT, MMM, OP clear, EOMI, , no proptosis or lid retraction  Neck: no thyromegaly or palpable thyroid nodules   Respiratory: lungs CTAB.  Cardiovascular: regular rhythm, normal S1 and S2, no audible murmurs, no peripheral edema  GI: soft, NT/ND, no masses/HSM appreciated.  Neurology: no tremors, DTR 2+  Skin: no visible rashes/lesions  Psychiatric: AAO x 3, normal affect/mood.  Ext: radial pulses intact, DP pulses intact, extremities warm, no cyanosis, clubbing or edema.       LABS:                        13.5   6.78  )-----------( 249      ( 2022 06:55 )             40.0         136  |  101  |  9   ----------------------------<  257<H>  4.5   |  25  |  0.57    Ca    9.2      2022 06:55  Mg     2.0         TPro  7.2  /  Alb  4.6  /  TBili  0.4  /  DBili  x   /  AST  19  /  ALT  36  /  AlkPhos  65  -15      Urinalysis Basic - ( 15 Apr 2022 21:00 )    Color: Yellow / Appearance: Clear / S.010 / pH: x  Gluc: x / Ketone: NEGATIVE  / Bili: Negative / Urobili: 0.2 E.U./dL   Blood: x / Protein: NEGATIVE mg/dL / Nitrite: NEGATIVE   Leuk Esterase: NEGATIVE / RBC: x / WBC x   Sq Epi: x / Non Sq Epi: x / Bacteria: x        Thyroid Stimulating Hormone, Serum: <0.118 (04-15 @ 19:05)      RADIOLOGY & ADDITIONAL STUDIES:  CAPILLARY BLOOD GLUCOSE      POCT Blood Glucose.: 202 mg/dL (2022 12:03)  POCT Blood Glucose.: 228 mg/dL (2022 06:39)  POCT Blood Glucose.: 225 mg/dL (2022 01:16)  POCT Blood Glucose.: 178 mg/dL (15 Apr 2022 23:28)        A/P:66 yo F with PMH DM, HTN, HLD, depression who presents with CC of chest pain and back pain. Admitted for further workup and monitoring to cardiac telemetry given extensive family hx and other risk factors. Going for CTA monday.  A1c: 10.4%  Wt:70kg  Cr:  GFR:  TSH: <0.118    1.  DM type 2  Please continue lantus   37    units at night.    Please continue lispro   6   units before each meal.    Please continue lispro moderate dose sliding scale four times daily with meals and at bedtime    Pt's fingerstick glucose goal is 100-180    2. Hypothyroidism-hx of thyroidectomy   on 150mcg Levothyroxine at home   TSH suppressed  Would need free thyroxine level   For now reduce Levothyroxine to 125mcg daily       Will continue to monitor     For discharge, pt can continue TBD    Pt can follow up at discharge with [facility] by calling  to make an appointment.   Will discuss case with Dr. Avelar and update primary team

## 2022-04-16 NOTE — PROGRESS NOTE ADULT - PROBLEM SELECTOR PLAN 3
official med rec in am.  -c/w home norvasc 10 mg  -c/w home lisinopril 40 mg  -c/w lopressor 25 mg BID (verify dose) SBP 120s-140s  - c/w Home Amlodipine 10 mg PO QD, Lisinopril 40 mg PO QD  - home Lopressor 25 mg PO BID titrated to 50 mg PO BID  - Started Imdur 30 mg PO QD SBP 120s-140s  - c/w Home Amlodipine 10 mg PO QD, Lisinopril 40 mg PO QD  - Lopressor 25 mg PO BID titrated to 50 mg PO BID  - Started Imdur 30 mg PO QD

## 2022-04-16 NOTE — PROGRESS NOTE ADULT - ASSESSMENT
66 yo F, strong FHx MI (son @ 45, daughter @ 40), PMHx HTN, HLD, DM, chronic back pain, depression who presented to [facility] ED c/o progressively worsened LSCP --> RSCP radiating down her spine of severe intensity with associated palpitations/nausea occurring independent of exertion 2 days, trop neg x3, EKG non-ischemic, ECHO with preserved EF, CTA chest/aorta without aortic dissection or aneurysm. Does not have Cardiologist, last EST many years ago. plan for NST on Monday for ischemic eval. 68 yo F, strong FHx MI (son @ 45, daughter @ 40), PMHx HTN, HLD, DM, chronic back pain, depression who presented to [facility] ED c/o progressively worsened LSCP --> RSCP radiating down her spine of severe intensity with associated palpitations/nausea occurring independent of exertion 2 days, trop neg x3, EKG non-ischemic, ECHO with preserved EF, CTA chest/aorta without aortic dissection or aneurysm. Does not have Cardiologist, last stress test 2018 which was normal per her report, plan for NST on Monday for ischemic eval. 66 yo F, strong FHx MI (son @ 45, daughter @ 40), PMHx HTN, HLD, DM, chronic back pain, depression who presented to [facility] ED c/o progressively worsened LSCP --> RSCP radiating down her spine of severe intensity with associated palpitations/nausea occurring independent of exertion 2 days, trop neg x3, EKG non-ischemic, ECHO with preserved EF, CTA chest/aorta without aortic dissection or aneurysm. Does not have Cardiologist, last stress test 2018 which was normal per her report, plan for CCTA on Monday for ischemic eval.

## 2022-04-16 NOTE — PROGRESS NOTE ADULT - PROBLEM SELECTOR PLAN 6
-c/w lipitor 80 mg - c/w Atorvastatin 80 mg PO QD (takes Crestor 40 mg PO QD at home)  - f/u AM lipid panel

## 2022-04-16 NOTE — PROGRESS NOTE ADULT - PROBLEM SELECTOR PLAN 1
Endorses continued LSCP --> RSCP 4/10 intensity improved since admit  - Trop neg x3  - Last stress test 2018 @ [place] which she stated was normal  - ECHO 4/16/22: mild symmetric LVH, LVEF 70%, normal LV diastolic function, no significant valvular disease. Endorses continued LSCP --> RSCP 4/10 intensity improved since admit, slightly worsened with deep breathing/upon palpation  - Trop neg x3  - f/u ESR/CRP  - EKG 4/16/22: NSR @ 86 BM , flattened T wave in III only  - Last stress test 2018 @ [place] which she stated was normal  - ECHO 4/16/22: mild symmetric LVH, LVEF 70%, normal LV diastolic function, no significant valvular disease.  - Plan for NST on Monday Endorses continued LSCP --> RSCP 4/10 intensity improved since admit, slightly worsened with deep breathing/upon palpation  - Trop neg x3  - f/u ESR/CRP  - EKG 4/16/22: NSR @ 86 BM , flattened T wave in III only  - Last stress test 2018 @ [place] which she stated was normal  - ECHO 4/16/22: mild symmetric LVH, LVEF 70%, normal LV diastolic function, no significant valvular disease.  - Start Imdur 30 mg PO QD. Titrate Lopressor to 50 mg PO BID.  - Plan for CCTA to evaluate coronary anatomy on Monday Currently CP free and hemodynamically stable  - Trop neg x3  - f/u ESR/CRP  - EKG 4/16/22: NSR @ 86 BM , flattened T wave in III only  - Last stress test 2018 @ [place] which she stated was normal  - ECHO 4/16/22: mild symmetric LVH, LVEF 70%, normal LV diastolic function, no significant valvular disease.  - Start Imdur 30 mg PO QD. Titrate Lopressor to 50 mg PO BID.  - Plan for CCTA to evaluate coronary anatomy on Monday Currently CP free and hemodynamically stable  - Trop neg x3  - f/u ESR/CRP  - EKG 4/16/22: NSR @ 86 BM , flattened T wave in III only  - Last stress test 2018 @ [place] which she stated was normal  - ECHO 4/16/22: mild symmetric LVH, LVEF 70%, normal LV diastolic function, no significant valvular disease.  - CT head NC 4/15/22: No acute intracranial injury.  - CTA C/A/P 4/15/22: No aortic dissection or aneurysm., no change 4 mm nodule in the right middle lobe, mild centrilobular emphysema.  - Start Imdur 30 mg PO QD. Titrate Lopressor to 50 mg PO BID.  - Plan for CCTA to evaluate coronary anatomy on Monday

## 2022-04-16 NOTE — PROGRESS NOTE ADULT - PROBLEM SELECTOR PLAN 5
-c/w paroxetine 40 mg  -patient has clonazepam 2 mg listed on home meds unclear frequency (I Stop in AM)-->monitor for signs of withdrawal - c/w Paroxetine 40 mg  - Has Clonazepam 2 mg listed on home meds unclear frequency, monitor for signs of withdrawal

## 2022-04-16 NOTE — PROGRESS NOTE ADULT - PROBLEM SELECTOR PLAN 7
-c/w synthroid 150 mcg - c/w Synthroid 150 mcg PO QD  - TSH low <0.118, f/u AM TFTs - c/w Synthroid 150 mcg PO QD for now  - TSH low <0.118, f/u AM TFTs  - Endo consulted, f/u reccs    VTE ppx: SQH  Dispo: PT eval ordered    Case d/w Dr. Fernandez

## 2022-04-16 NOTE — PROGRESS NOTE ADULT - PROBLEM SELECTOR PLAN 2
Patient noted to take tresiba and insulin aspart on home meds. However, no doses listed.  -med rec in AM  -weight based lantus for now pending med rec-->at 0.3 mg/kg=roughly 10 units lantus QHS  -mod ISS HgbA1C 10.4%,  -228 HgbA1C 10.4%,  -228  - Home: Lantus 50 units qAM, nutritional insulin 8-10 units TID before meals  - Endocrine consulted HgbA1C 10.4%,  -228  - Home: Lantus 50 units qAM, nutritional insulin 8-10 units TID before meals  - c/w Lantus 37 units qHS, Lispro 6 units TID with meals  - Endocrine consulted, f/u reccs

## 2022-04-16 NOTE — PROGRESS NOTE ADULT - SUBJECTIVE AND OBJECTIVE BOX
Interventional Cardiology PA Adult Progress Note    Subjective Assessment:  Pt seen and evaluated at bedside this AM. CP & back pain improved to 4/10. RLE pain improved. Endorses headache and ear throbbing. Denies SOB, dizziness, palpitations, N/V, abdominal pain. All other ROS otherwise negative except per subjective.   	  MEDICATIONS:  amLODIPine   Tablet 10 milliGRAM(s) Oral every 24 hours  lisinopril 40 milliGRAM(s) Oral every 24 hours  metoprolol tartrate 25 milliGRAM(s) Oral every 12 hours  PARoxetine 40 milliGRAM(s) Oral daily  pantoprazole    Tablet 40 milliGRAM(s) Oral before breakfast  atorvastatin 80 milliGRAM(s) Oral at bedtime  dextrose 50% Injectable 25 Gram(s) IV Push once  dextrose Oral Gel 15 Gram(s) Oral once PRN  glucagon  Injectable 1 milliGRAM(s) IntraMuscular once  insulin glargine Injectable (LANTUS) 10 Unit(s) SubCutaneous at bedtime  insulin lispro (ADMELOG) corrective regimen sliding scale   SubCutaneous three times a day before meals  levothyroxine 150 MICROGram(s) Oral daily  aspirin enteric coated 81 milliGRAM(s) Oral daily  dextrose 5%. 1000 milliLiter(s) IV Continuous <Continuous>  heparin   Injectable 5000 Unit(s) SubCutaneous every 8 hours      [PHYSICAL EXAM:  TELEMETRY:  T(C): 36.7 (04-16-22 @ 09:51), Max: 36.7 (04-15-22 @ 18:01)  HR: 74 (04-16-22 @ 09:30) (67 - 105)  BP: 143/63 (04-16-22 @ 09:30) (127/61 - 185/78)  RR: 16 (04-16-22 @ 09:30) (16 - 18)  SpO2: 97% (04-16-22 @ 09:30) (96% - 98%)  Wt(kg): --  I&O's Summary    Height (cm): 157.5 (04-15 @ 18:01)  Burton:  Central/PICC/Mid Line:                                         Appearance: Laying in hospital bed in NAD	  HEENT:  EOMI	  Neck:  - JVD  Cardiovascular: Normal S1 S2, No murmurs,   Respiratory: CTA B/L, no w/r/r	  Gastrointestinal:  Soft, Non-tender, + BS	x4  Skin: No rashes, No ecchymoses, No cyanosis  Extremities: No pedal edema B/L  Vascular: DP 1+ B/L, PT faint B/L  Neurologic: Non-focal  Psychiatry: A & O x 3, Mood & affect appropriate                          13.5   6.78  )-----------( 249      ( 16 Apr 2022 06:55 )             40.0     04-16    136  |  101  |  9   ----------------------------<  257<H>  4.5   |  25  |  0.57    Ca    9.2      16 Apr 2022 06:55  Mg     2.0     04-16    TPro  7.2  /  Alb  4.6  /  TBili  0.4  /  DBili  x   /  AST  19  /  ALT  36  /  AlkPhos  65  04-15  TSH: Thyroid Stimulating Hormone, Serum: <0.118 uIU/mL (04-15 @ 19:05)   Interventional Cardiology PA Adult Progress Note    Subjective Assessment:  Pt seen and evaluated at bedside this AM. CP & back pain improved to 4/10. RLE pain improved. Endorses headache and ear throbbing. Denies SOB, dizziness, palpitations, N/V, abdominal pain. All other ROS otherwise negative except per subjective.   	  MEDICATIONS:  amLODIPine   Tablet 10 milliGRAM(s) Oral every 24 hours  lisinopril 40 milliGRAM(s) Oral every 24 hours  metoprolol tartrate 25 milliGRAM(s) Oral every 12 hours  PARoxetine 40 milliGRAM(s) Oral daily  pantoprazole    Tablet 40 milliGRAM(s) Oral before breakfast  atorvastatin 80 milliGRAM(s) Oral at bedtime  dextrose 50% Injectable 25 Gram(s) IV Push once  dextrose Oral Gel 15 Gram(s) Oral once PRN  glucagon  Injectable 1 milliGRAM(s) IntraMuscular once  insulin glargine Injectable (LANTUS) 10 Unit(s) SubCutaneous at bedtime  insulin lispro (ADMELOG) corrective regimen sliding scale   SubCutaneous three times a day before meals  levothyroxine 150 MICROGram(s) Oral daily  aspirin enteric coated 81 milliGRAM(s) Oral daily  dextrose 5%. 1000 milliLiter(s) IV Continuous <Continuous>  heparin   Injectable 5000 Unit(s) SubCutaneous every 8 hours      [PHYSICAL EXAM:  TELEMETRY:  T(C): 36.7 (04-16-22 @ 09:51), Max: 36.7 (04-15-22 @ 18:01)  HR: 74 (04-16-22 @ 09:30) (67 - 105)  BP: 143/63 (04-16-22 @ 09:30) (127/61 - 185/78)  RR: 16 (04-16-22 @ 09:30) (16 - 18)  SpO2: 97% (04-16-22 @ 09:30) (96% - 98%)  Wt(kg): --  I&O's Summary    Height (cm): 157.5 (04-15 @ 18:01)  Burton:  Central/PICC/Mid Line:                                         Appearance: Laying in hospital bed in NAD	  HEENT:  EOMI	  Neck:  - JVD  Cardiovascular: Normal S1 S2, No murmurs, mild TTP to chest  Respiratory: CTA B/L, no w/r/r	  Gastrointestinal:  Soft, Non-tender, + BS	x4  Skin: No rashes, No ecchymoses, No cyanosis  Extremities: No pedal edema B/L  Vascular: DP 1+ B/L, PT faint B/L  Neurologic: Non-focal  Psychiatry: A & O x 3, Mood & affect appropriate                          13.5   6.78  )-----------( 249      ( 16 Apr 2022 06:55 )             40.0     04-16    136  |  101  |  9   ----------------------------<  257<H>  4.5   |  25  |  0.57    Ca    9.2      16 Apr 2022 06:55  Mg     2.0     04-16    TPro  7.2  /  Alb  4.6  /  TBili  0.4  /  DBili  x   /  AST  19  /  ALT  36  /  AlkPhos  65  04-15  TSH: Thyroid Stimulating Hormone, Serum: <0.118 uIU/mL (04-15 @ 19:05)   Interventional Cardiology PA Adult Progress Note    Subjective Assessment:  Pt seen and evaluated at bedside this AM. CP & back pain improved to 4/10. RLE pain improved. Endorses headache and ear throbbing. Denies SOB, dizziness, palpitations, N/V, abdominal pain. All other ROS otherwise negative except per subjective.   	  MEDICATIONS:  amLODIPine   Tablet 10 milliGRAM(s) Oral every 24 hours  lisinopril 40 milliGRAM(s) Oral every 24 hours  metoprolol tartrate 25 milliGRAM(s) Oral every 12 hours  PARoxetine 40 milliGRAM(s) Oral daily  pantoprazole    Tablet 40 milliGRAM(s) Oral before breakfast  atorvastatin 80 milliGRAM(s) Oral at bedtime  dextrose 50% Injectable 25 Gram(s) IV Push once  dextrose Oral Gel 15 Gram(s) Oral once PRN  glucagon  Injectable 1 milliGRAM(s) IntraMuscular once  insulin glargine Injectable (LANTUS) 10 Unit(s) SubCutaneous at bedtime  insulin lispro (ADMELOG) corrective regimen sliding scale   SubCutaneous three times a day before meals  levothyroxine 150 MICROGram(s) Oral daily  aspirin enteric coated 81 milliGRAM(s) Oral daily  dextrose 5%. 1000 milliLiter(s) IV Continuous <Continuous>  heparin   Injectable 5000 Unit(s) SubCutaneous every 8 hours      [PHYSICAL EXAM:  TELEMETRY:  T(C): 36.7 (04-16-22 @ 09:51), Max: 36.7 (04-15-22 @ 18:01)  HR: 74 (04-16-22 @ 09:30) (67 - 105)  BP: 143/63 (04-16-22 @ 09:30) (127/61 - 185/78)  RR: 16 (04-16-22 @ 09:30) (16 - 18)  SpO2: 97% (04-16-22 @ 09:30) (96% - 98%)  Wt(kg): --  I&O's Summary    Height (cm): 157.5 (04-15 @ 18:01)  Burton:  Central/PICC/Mid Line:                                         Appearance: Laying in hospital bed in NAD	  HEENT:  EOMI	  Neck:  - JVD  Cardiovascular: Normal S1 S2, No murmurs  Respiratory: CTA B/L, no w/r/r	  Gastrointestinal:  Soft, Non-tender, + BS	x4  Skin: No rashes, No ecchymoses, No cyanosis  Extremities: No pedal edema B/L  Vascular: DP 1+ B/L, PT faint B/L  Neurologic: Non-focal  Psychiatry: A & O x 3, Mood & affect appropriate                          13.5   6.78  )-----------( 249      ( 16 Apr 2022 06:55 )             40.0     04-16    136  |  101  |  9   ----------------------------<  257<H>  4.5   |  25  |  0.57    Ca    9.2      16 Apr 2022 06:55  Mg     2.0     04-16    TPro  7.2  /  Alb  4.6  /  TBili  0.4  /  DBili  x   /  AST  19  /  ALT  36  /  AlkPhos  65  04-15  TSH: Thyroid Stimulating Hormone, Serum: <0.118 uIU/mL (04-15 @ 19:05)

## 2022-04-16 NOTE — PATIENT PROFILE ADULT - FALL HARM RISK - HARM RISK INTERVENTIONS

## 2022-04-17 LAB
ANION GAP SERPL CALC-SCNC: 10 MMOL/L — SIGNIFICANT CHANGE UP (ref 5–17)
BUN SERPL-MCNC: 13 MG/DL — SIGNIFICANT CHANGE UP (ref 7–23)
CALCIUM SERPL-MCNC: 9 MG/DL — SIGNIFICANT CHANGE UP (ref 8.4–10.5)
CHLORIDE SERPL-SCNC: 105 MMOL/L — SIGNIFICANT CHANGE UP (ref 96–108)
CHOLEST SERPL-MCNC: 144 MG/DL — SIGNIFICANT CHANGE UP
CO2 SERPL-SCNC: 25 MMOL/L — SIGNIFICANT CHANGE UP (ref 22–31)
CREAT SERPL-MCNC: 0.59 MG/DL — SIGNIFICANT CHANGE UP (ref 0.5–1.3)
EGFR: 99 ML/MIN/1.73M2 — SIGNIFICANT CHANGE UP
GLUCOSE BLDC GLUCOMTR-MCNC: 115 MG/DL — HIGH (ref 70–99)
GLUCOSE BLDC GLUCOMTR-MCNC: 128 MG/DL — HIGH (ref 70–99)
GLUCOSE BLDC GLUCOMTR-MCNC: 131 MG/DL — HIGH (ref 70–99)
GLUCOSE BLDC GLUCOMTR-MCNC: 134 MG/DL — HIGH (ref 70–99)
GLUCOSE BLDC GLUCOMTR-MCNC: 187 MG/DL — HIGH (ref 70–99)
GLUCOSE BLDC GLUCOMTR-MCNC: 193 MG/DL — HIGH (ref 70–99)
GLUCOSE SERPL-MCNC: 147 MG/DL — HIGH (ref 70–99)
HCT VFR BLD CALC: 37.7 % — SIGNIFICANT CHANGE UP (ref 34.5–45)
HDLC SERPL-MCNC: 33 MG/DL — LOW
HGB BLD-MCNC: 12.4 G/DL — SIGNIFICANT CHANGE UP (ref 11.5–15.5)
LIPID PNL WITH DIRECT LDL SERPL: 92 MG/DL — SIGNIFICANT CHANGE UP
MAGNESIUM SERPL-MCNC: 2 MG/DL — SIGNIFICANT CHANGE UP (ref 1.6–2.6)
MCHC RBC-ENTMCNC: 27.7 PG — SIGNIFICANT CHANGE UP (ref 27–34)
MCHC RBC-ENTMCNC: 32.9 GM/DL — SIGNIFICANT CHANGE UP (ref 32–36)
MCV RBC AUTO: 84.3 FL — SIGNIFICANT CHANGE UP (ref 80–100)
NON HDL CHOLESTEROL: 111 MG/DL — SIGNIFICANT CHANGE UP
NRBC # BLD: 0 /100 WBCS — SIGNIFICANT CHANGE UP (ref 0–0)
PLATELET # BLD AUTO: 248 K/UL — SIGNIFICANT CHANGE UP (ref 150–400)
POTASSIUM SERPL-MCNC: 4 MMOL/L — SIGNIFICANT CHANGE UP (ref 3.5–5.3)
POTASSIUM SERPL-SCNC: 4 MMOL/L — SIGNIFICANT CHANGE UP (ref 3.5–5.3)
RBC # BLD: 4.47 M/UL — SIGNIFICANT CHANGE UP (ref 3.8–5.2)
RBC # FLD: 12.9 % — SIGNIFICANT CHANGE UP (ref 10.3–14.5)
SODIUM SERPL-SCNC: 140 MMOL/L — SIGNIFICANT CHANGE UP (ref 135–145)
T4 FREE SERPL-MCNC: 1.56 NG/DL — SIGNIFICANT CHANGE UP (ref 0.93–1.7)
TRIGL SERPL-MCNC: 93 MG/DL — SIGNIFICANT CHANGE UP
TSH SERPL-MCNC: 0.22 UIU/ML — LOW (ref 0.27–4.2)
WBC # BLD: 6.3 K/UL — SIGNIFICANT CHANGE UP (ref 3.8–10.5)
WBC # FLD AUTO: 6.3 K/UL — SIGNIFICANT CHANGE UP (ref 3.8–10.5)

## 2022-04-17 PROCEDURE — 99233 SBSQ HOSP IP/OBS HIGH 50: CPT

## 2022-04-17 RX ORDER — ACETAMINOPHEN 500 MG
650 TABLET ORAL ONCE
Refills: 0 | Status: COMPLETED | OUTPATIENT
Start: 2022-04-17 | End: 2022-04-17

## 2022-04-17 RX ORDER — CLONAZEPAM 1 MG
2 TABLET ORAL ONCE
Refills: 0 | Status: DISCONTINUED | OUTPATIENT
Start: 2022-04-17 | End: 2022-04-17

## 2022-04-17 RX ADMIN — AMLODIPINE BESYLATE 10 MILLIGRAM(S): 2.5 TABLET ORAL at 12:32

## 2022-04-17 RX ADMIN — Medication 40 MILLIGRAM(S): at 12:32

## 2022-04-17 RX ADMIN — HEPARIN SODIUM 5000 UNIT(S): 5000 INJECTION INTRAVENOUS; SUBCUTANEOUS at 14:17

## 2022-04-17 RX ADMIN — HEPARIN SODIUM 5000 UNIT(S): 5000 INJECTION INTRAVENOUS; SUBCUTANEOUS at 21:50

## 2022-04-17 RX ADMIN — Medication 650 MILLIGRAM(S): at 02:29

## 2022-04-17 RX ADMIN — Medication 2 MILLIGRAM(S): at 16:58

## 2022-04-17 RX ADMIN — PANTOPRAZOLE SODIUM 40 MILLIGRAM(S): 20 TABLET, DELAYED RELEASE ORAL at 06:48

## 2022-04-17 RX ADMIN — LISINOPRIL 40 MILLIGRAM(S): 2.5 TABLET ORAL at 05:08

## 2022-04-17 RX ADMIN — HEPARIN SODIUM 5000 UNIT(S): 5000 INJECTION INTRAVENOUS; SUBCUTANEOUS at 05:09

## 2022-04-17 RX ADMIN — ATORVASTATIN CALCIUM 80 MILLIGRAM(S): 80 TABLET, FILM COATED ORAL at 21:51

## 2022-04-17 RX ADMIN — Medication 25 MILLIGRAM(S): at 21:51

## 2022-04-17 RX ADMIN — ISOSORBIDE MONONITRATE 30 MILLIGRAM(S): 60 TABLET, EXTENDED RELEASE ORAL at 21:50

## 2022-04-17 RX ADMIN — Medication 2: at 17:57

## 2022-04-17 RX ADMIN — Medication 6 UNIT(S): at 09:21

## 2022-04-17 RX ADMIN — Medication 125 MICROGRAM(S): at 05:09

## 2022-04-17 RX ADMIN — Medication 81 MILLIGRAM(S): at 12:32

## 2022-04-17 RX ADMIN — Medication 650 MILLIGRAM(S): at 01:44

## 2022-04-17 RX ADMIN — INSULIN GLARGINE 37 UNIT(S): 100 INJECTION, SOLUTION SUBCUTANEOUS at 21:51

## 2022-04-17 NOTE — DIETITIAN INITIAL EVALUATION ADULT - NS FNS DIET ORDER
Diet, NPO after Midnight:      NPO Start Date: 17-Apr-2022,   NPO Start Time: 23:59 (04-17-22 @ 11:14)

## 2022-04-17 NOTE — PROGRESS NOTE ADULT - PROBLEM SELECTOR PLAN 3
--CONT: Amlodipine 10mg PO QD, Lisinopril 40mg PO QD, Imdur 30mg PO QD (new this admit), and Lopressor 25mg PO BID.

## 2022-04-17 NOTE — PROGRESS NOTE ADULT - PROBLEM SELECTOR PLAN 2
--HgA1c 10.4%.    --HOME: Lantus 50u SQ qAM, Lispro 8-10u TID before meals.    --CONT: Lantus 37u SQ QHS, Lispro 6u TID w/ meals.    --Endo consulted – continue above insulin regimen. Decrease to Levothyroxine 125mcg PO QD (from home dose 50mcg daily). FS goal 100-180.    --Pt will need Endocrinology follow up on discharge.

## 2022-04-17 NOTE — DIETITIAN INITIAL EVALUATION ADULT - OTHER CALCULATIONS
lbs. IBW used to calculate energy needs due to pt's current body weight exceeding 120% of IBW (140%). Needs adjusted for age and wt. Nutrient needs based on [facility] standards of care for maintenance in adults.

## 2022-04-17 NOTE — DIETITIAN INITIAL EVALUATION ADULT - OTHER INFO
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 Trop T (-) x3 and EKG non-ischemic. TTE w/ preserved EF. CTA chest w/p aortic dissection/aneurysm. Last stress test 2018. Plan for ischemic eval w/ CCTA tomorrow Monday 4/18/22.     On assessment, pt seen resting in bed. Reports feeling well, denies pain. No reports of n/v/d/c. +BM 4/16. Planned for CCTA tomorrow. States normally eating 1 meal/day in evening, snacks throughout day on fruits, crackers, and bread. Understands need for better control of BG. States originally living in [place], now will leive with daughter in [facility], reports daughter will help with meal prep. She is currently on CST CHO + DASH TLC diet. Reviewed diet with pt, understands need for low sodium/fat/cholesterol diet. Reviewed CHO sources, importance of portion sizing and adequate CHO intake throughout day. Provided pt with handout to review. Pt receptive to education at this time. Questions/concerns addressed. UBW consistent with CBW. no significant wt loss. Skin: Rajesh 19, edema 1+ L and R leg. No PU. RD to follow per protocol. Please see below for nutr recs.

## 2022-04-17 NOTE — DIETITIAN INITIAL EVALUATION ADULT - PERSON TAUGHT/METHOD
Disc DASH TLC diet + diet edu for CST CHO diet/verbal instruction/patient instructed Additional Notes: Advised to take heliocare supplements and niacinamide 500 mg once daily Detail Level: Simple Render Risk Assessment In Note?: no

## 2022-04-17 NOTE — PROGRESS NOTE ADULT - PROBLEM SELECTOR PLAN 6
--On Levothyroxine 150mcg PO QD at home.    --Per Endo recs, DECREASE to Levothyroxine 125mcg PO QD.      VTE ppx: SQH   Dispo: PT eval ordered --On Levothyroxine 150mcg PO QD at home.    --Per Endo recs, DECREASE to Levothyroxine 125mcg PO QD.      VTE ppx: SQH   Dispo: pending results of CCTA  PT Eval: pending

## 2022-04-17 NOTE — PROGRESS NOTE ADULT - PROBLEM SELECTOR PLAN 1
--CP free and hemodynamically stable at this time; Trop T (-) x3; EKG non-ischemic.    --Last stress test 2018 @ [place] [place].   --CTA C/A/P (4/15/22): no aortic disseciton or aneurysm, no change 4mm nodule R middle lobe, mild centrilobular emphysema.    --TTE (4/16/22): LVEF 70%, mild sLVH, no significant valvular disease.    --PLAN: NPO after MN for CCTA tomorrow 4/18/22; further plan pending CCTA results.

## 2022-04-17 NOTE — DIETITIAN INITIAL EVALUATION ADULT - ORAL INTAKE PTA/DIET HISTORY
eats snacks throughout day such as crackers, bread and fruits. 1 meal/day: rice, beans, chicken/pork, salad

## 2022-04-17 NOTE — DIETITIAN INITIAL EVALUATION ADULT - ADD RECOMMEND
1. CST CHO + DASH TLC diet  2. BM and pain regimen per team  3. Monitor BMP, BG, POCT, lytes, replete prn  4. Diet edu/review and reinforce prn

## 2022-04-17 NOTE — PROGRESS NOTE ADULT - ASSESSMENT
66 yo F, strong FHx MI (son @ 45, daughter @ 40), PMHx HTN, HLD, DM, chronic back pain, depression who presented to [facility] ED c/o progressively worsened LSCP --> RSCP radiating down her spine of severe intensity with associated palpitations/nausea occurring independent of exertion 2 days, trop neg x3, EKG non-ischemic, ECHO with preserved EF, CTA chest/aorta without aortic dissection or aneurysm. Does not have Cardiologist, last stress test 2018 which was normal per her report, plan for CCTA on Monday for ischemic eval. 66yo F, strong FHx of MI and PMHx of HTN, HLD, DM T2, back pain, depression who presented w/ CP (radiation LSCP --> RSCP) associated w/ palpitations/nausea; CP independent of exertion. Trop T (-) x3 and EKG non-ischemic. TTE w/ preserved EF. CTA chest w/p aortic dissection/aneurysm. Last stress test 2018. Plan for ischemic eval w/ CCTA tomorrow Monday 4/18/22.

## 2022-04-17 NOTE — PROGRESS NOTE ADULT - SUBJECTIVE AND OBJECTIVE BOX
***INCOMPLETE / IN PROGRESS NOTE**    Cardiology PA Adult Progress Note    Subjective Assessment:  	  MEDICATIONS:  amLODIPine   Tablet 10 milliGRAM(s) Oral every 24 hours  isosorbide   mononitrate ER Tablet (IMDUR) 30 milliGRAM(s) Oral daily  lisinopril 40 milliGRAM(s) Oral every 24 hours  metoprolol tartrate 25 milliGRAM(s) Oral two times a day  PARoxetine 40 milliGRAM(s) Oral daily  pantoprazole    Tablet 40 milliGRAM(s) Oral before breakfast  atorvastatin 80 milliGRAM(s) Oral at bedtime  dextrose 50% Injectable 25 Gram(s) IV Push once  dextrose Oral Gel 15 Gram(s) Oral once PRN  glucagon  Injectable 1 milliGRAM(s) IntraMuscular once  insulin glargine Injectable (LANTUS) 37 Unit(s) SubCutaneous at bedtime  insulin lispro (ADMELOG) corrective regimen sliding scale   SubCutaneous three times a day before meals  insulin lispro Injectable (ADMELOG) 6 Unit(s) SubCutaneous three times a day before meals  levothyroxine 125 MICROGram(s) Oral daily  aspirin enteric coated 81 milliGRAM(s) Oral daily  dextrose 5%. 1000 milliLiter(s) IV Continuous <Continuous>  heparin   Injectable 5000 Unit(s) SubCutaneous every 8 hours      PHYSICAL EXAM:  TELEMETRY:  T(C): 36.5 (04-17-22 @ 06:20), Max: 36.9 (04-16-22 @ 18:15)  HR: 52 (04-17-22 @ 08:34) (52 - 72)  BP: 125/58 (04-17-22 @ 08:34) (102/58 - 125/58)  RR: 16 (04-17-22 @ 08:34) (16 - 18)  SpO2: 95% (04-17-22 @ 08:34) (93% - 98%)  Wt(kg): --  I&O's Summary    16 Apr 2022 07:01  -  17 Apr 2022 07:00  --------------------------------------------------------  IN: 650 mL / OUT: 0 mL / NET: 650 mL    Weight (kg): 70.6 (04-16 @ 12:24)      Appearance: Laying in hospital bed in NAD	  HEENT:  EOMI	  Neck:  - JVD  Cardiovascular: Normal S1 S2, No murmurs  Respiratory: CTA B/L, no w/r/r	  Gastrointestinal:  Soft, Non-tender, + BS	x4  Skin: No rashes, No ecchymoses, No cyanosis  Extremities: No pedal edema B/L  Vascular: DP 1+ B/L, PT faint B/L  Neurologic: Non-focal  Psychiatry: A & O x 3, Mood & affect appropriate      LABS:	 	  CARDIAC MARKERS:                        12.4   6.30  )-----------( 248      ( 17 Apr 2022 06:41 )             37.7     140  |  105  |  13  ----------------------------<  147<H>  4.0   |  25  |  0.59    Ca    9.0      17 Apr 2022 06:41    Mg     2.0     04-17    TPro  7.2  /  Alb  4.6  /  TBili  0.4  /  DBili  x   /  AST  19  /  ALT  36  /  AlkPhos  65  04-15    TSH: Thyroid Stimulating Hormone, Serum: 0.221 uIU/mL (04-17 @ 06:41)     Cardiology PA Adult Progress Note    Subjective Assessment: Pt seen and evaluated at bedside. Feels well and no CP at this time. Pt understands plan for the day.   	  MEDICATIONS:  amLODIPine   Tablet 10 milliGRAM(s) Oral every 24 hours  isosorbide   mononitrate ER Tablet (IMDUR) 30 milliGRAM(s) Oral daily  lisinopril 40 milliGRAM(s) Oral every 24 hours  metoprolol tartrate 25 milliGRAM(s) Oral two times a day  PARoxetine 40 milliGRAM(s) Oral daily  pantoprazole    Tablet 40 milliGRAM(s) Oral before breakfast  atorvastatin 80 milliGRAM(s) Oral at bedtime  dextrose 50% Injectable 25 Gram(s) IV Push once  dextrose Oral Gel 15 Gram(s) Oral once PRN  glucagon  Injectable 1 milliGRAM(s) IntraMuscular once  insulin glargine Injectable (LANTUS) 37 Unit(s) SubCutaneous at bedtime  insulin lispro (ADMELOG) corrective regimen sliding scale   SubCutaneous three times a day before meals  insulin lispro Injectable (ADMELOG) 6 Unit(s) SubCutaneous three times a day before meals  levothyroxine 125 MICROGram(s) Oral daily  aspirin enteric coated 81 milliGRAM(s) Oral daily  dextrose 5%. 1000 milliLiter(s) IV Continuous <Continuous>  heparin   Injectable 5000 Unit(s) SubCutaneous every 8 hours      PHYSICAL EXAM:  TELEMETRY: no event on tele.   T(C): 36.5 (04-17-22 @ 06:20), Max: 36.9 (04-16-22 @ 18:15)  HR: 52 (04-17-22 @ 08:34) (52 - 72)  BP: 125/58 (04-17-22 @ 08:34) (102/58 - 125/58)  RR: 16 (04-17-22 @ 08:34) (16 - 18)  SpO2: 95% (04-17-22 @ 08:34) (93% - 98%)  Wt(kg): --  I&O's Summary    16 Apr 2022 07:01  -  17 Apr 2022 07:00  --------------------------------------------------------  IN: 650 mL / OUT: 0 mL / NET: 650 mL    Weight (kg): 70.6 (04-16 @ 12:24)      Appearance: Laying in hospital bed in NAD	  HEENT:  EOMI	  Neck:  - JVD  Cardiovascular: Normal S1 S2, No murmurs  Respiratory: CTA B/L, no w/r/r	  Gastrointestinal:  Soft, Non-tender, + BS	x4  Skin: No rashes, No ecchymoses, No cyanosis  Extremities: No pedal edema B/L  Vascular: DP 1+ B/L, PT faint B/L  Neurologic: Non-focal  Psychiatry: A & O x 3, Mood & affect appropriate      LABS:	 	  CARDIAC MARKERS:                        12.4   6.30  )-----------( 248      ( 17 Apr 2022 06:41 )             37.7     140  |  105  |  13  ----------------------------<  147<H>  4.0   |  25  |  0.59    Ca    9.0      17 Apr 2022 06:41    Mg     2.0     04-17    TPro  7.2  /  Alb  4.6  /  TBili  0.4  /  DBili  x   /  AST  19  /  ALT  36  /  AlkPhos  65  04-15    TSH: Thyroid Stimulating Hormone, Serum: 0.221 uIU/mL (04-17 @ 06:41)

## 2022-04-17 NOTE — DIETITIAN INITIAL EVALUATION ADULT - PERTINENT LABORATORY DATA
04-17    140  |  105  |  13  ----------------------------<  147<H>  4.0   |  25  |  0.59    Ca    9.0      17 Apr 2022 06:41  Mg     2.0     04-17    TPro  7.2  /  Alb  4.6  /  TBili  0.4  /  DBili  x   /  AST  19  /  ALT  36  /  AlkPhos  65  04-15  POCT Blood Glucose.: 115 mg/dL (04-17-22 @ 12:44)  A1C with Estimated Average Glucose Result: 10.4 % (04-16-22 @ 06:55)

## 2022-04-17 NOTE — PROGRESS NOTE ADULT - PROBLEM SELECTOR PLAN 4
--CONT: Paroxetine 40mg PO QD.    --Has Clonazepam 2mg listed on home meds, unclear frequency – monitor for signs of withdrawal.

## 2022-04-17 NOTE — DIETITIAN INITIAL EVALUATION ADULT - PERTINENT MEDS FT
MEDICATIONS  (STANDING):  amLODIPine   Tablet 10 milliGRAM(s) Oral every 24 hours  aspirin enteric coated 81 milliGRAM(s) Oral daily  atorvastatin 80 milliGRAM(s) Oral at bedtime  dextrose 5%. 1000 milliLiter(s) (50 mL/Hr) IV Continuous <Continuous>  dextrose 50% Injectable 25 Gram(s) IV Push once  glucagon  Injectable 1 milliGRAM(s) IntraMuscular once  heparin   Injectable 5000 Unit(s) SubCutaneous every 8 hours  insulin glargine Injectable (LANTUS) 37 Unit(s) SubCutaneous at bedtime  insulin lispro (ADMELOG) corrective regimen sliding scale   SubCutaneous three times a day before meals  insulin lispro Injectable (ADMELOG) 6 Unit(s) SubCutaneous three times a day before meals  isosorbide   mononitrate ER Tablet (IMDUR) 30 milliGRAM(s) Oral daily  levothyroxine 125 MICROGram(s) Oral daily  lisinopril 40 milliGRAM(s) Oral every 24 hours  metoprolol tartrate 25 milliGRAM(s) Oral two times a day  pantoprazole    Tablet 40 milliGRAM(s) Oral before breakfast  PARoxetine 40 milliGRAM(s) Oral daily    MEDICATIONS  (PRN):  dextrose Oral Gel 15 Gram(s) Oral once PRN Blood Glucose LESS THAN 70 milliGRAM(s)/deciliter

## 2022-04-18 ENCOUNTER — TRANSCRIPTION ENCOUNTER (OUTPATIENT)
Age: 67
End: 2022-04-18

## 2022-04-18 LAB
ANION GAP SERPL CALC-SCNC: 9 MMOL/L — SIGNIFICANT CHANGE UP (ref 5–17)
BUN SERPL-MCNC: 11 MG/DL — SIGNIFICANT CHANGE UP (ref 7–23)
CALCIUM SERPL-MCNC: 8.9 MG/DL — SIGNIFICANT CHANGE UP (ref 8.4–10.5)
CHLORIDE SERPL-SCNC: 106 MMOL/L — SIGNIFICANT CHANGE UP (ref 96–108)
CO2 SERPL-SCNC: 26 MMOL/L — SIGNIFICANT CHANGE UP (ref 22–31)
CREAT SERPL-MCNC: 0.66 MG/DL — SIGNIFICANT CHANGE UP (ref 0.5–1.3)
EGFR: 97 ML/MIN/1.73M2 — SIGNIFICANT CHANGE UP
GLUCOSE BLDC GLUCOMTR-MCNC: 107 MG/DL — HIGH (ref 70–99)
GLUCOSE BLDC GLUCOMTR-MCNC: 154 MG/DL — HIGH (ref 70–99)
GLUCOSE BLDC GLUCOMTR-MCNC: 166 MG/DL — HIGH (ref 70–99)
GLUCOSE BLDC GLUCOMTR-MCNC: 286 MG/DL — HIGH (ref 70–99)
GLUCOSE SERPL-MCNC: 175 MG/DL — HIGH (ref 70–99)
HCT VFR BLD CALC: 39 % — SIGNIFICANT CHANGE UP (ref 34.5–45)
HGB BLD-MCNC: 13 G/DL — SIGNIFICANT CHANGE UP (ref 11.5–15.5)
MAGNESIUM SERPL-MCNC: 2.2 MG/DL — SIGNIFICANT CHANGE UP (ref 1.6–2.6)
MCHC RBC-ENTMCNC: 27.9 PG — SIGNIFICANT CHANGE UP (ref 27–34)
MCHC RBC-ENTMCNC: 33.3 GM/DL — SIGNIFICANT CHANGE UP (ref 32–36)
MCV RBC AUTO: 83.7 FL — SIGNIFICANT CHANGE UP (ref 80–100)
NRBC # BLD: 0 /100 WBCS — SIGNIFICANT CHANGE UP (ref 0–0)
PLATELET # BLD AUTO: 239 K/UL — SIGNIFICANT CHANGE UP (ref 150–400)
POTASSIUM SERPL-MCNC: 3.9 MMOL/L — SIGNIFICANT CHANGE UP (ref 3.5–5.3)
POTASSIUM SERPL-SCNC: 3.9 MMOL/L — SIGNIFICANT CHANGE UP (ref 3.5–5.3)
RBC # BLD: 4.66 M/UL — SIGNIFICANT CHANGE UP (ref 3.8–5.2)
RBC # FLD: 13 % — SIGNIFICANT CHANGE UP (ref 10.3–14.5)
SODIUM SERPL-SCNC: 141 MMOL/L — SIGNIFICANT CHANGE UP (ref 135–145)
WBC # BLD: 7.82 K/UL — SIGNIFICANT CHANGE UP (ref 3.8–10.5)
WBC # FLD AUTO: 7.82 K/UL — SIGNIFICANT CHANGE UP (ref 3.8–10.5)

## 2022-04-18 PROCEDURE — 99233 SBSQ HOSP IP/OBS HIGH 50: CPT

## 2022-04-18 PROCEDURE — 75574 CT ANGIO HRT W/3D IMAGE: CPT | Mod: 26

## 2022-04-18 PROCEDURE — 93010 ELECTROCARDIOGRAM REPORT: CPT

## 2022-04-18 RX ORDER — CLOPIDOGREL BISULFATE 75 MG/1
600 TABLET, FILM COATED ORAL ONCE
Refills: 0 | Status: COMPLETED | OUTPATIENT
Start: 2022-04-19 | End: 2022-04-19

## 2022-04-18 RX ORDER — SODIUM CHLORIDE 9 MG/ML
1000 INJECTION INTRAMUSCULAR; INTRAVENOUS; SUBCUTANEOUS
Refills: 0 | Status: DISCONTINUED | OUTPATIENT
Start: 2022-04-18 | End: 2022-04-19

## 2022-04-18 RX ORDER — CLONAZEPAM 1 MG
2 TABLET ORAL ONCE
Refills: 0 | Status: DISCONTINUED | OUTPATIENT
Start: 2022-04-18 | End: 2022-04-19

## 2022-04-18 RX ORDER — POTASSIUM CHLORIDE 20 MEQ
10 PACKET (EA) ORAL ONCE
Refills: 0 | Status: COMPLETED | OUTPATIENT
Start: 2022-04-18 | End: 2022-04-18

## 2022-04-18 RX ORDER — CHLORHEXIDINE GLUCONATE 213 G/1000ML
1 SOLUTION TOPICAL ONCE
Refills: 0 | Status: DISCONTINUED | OUTPATIENT
Start: 2022-04-18 | End: 2022-04-20

## 2022-04-18 RX ADMIN — Medication 40 MILLIGRAM(S): at 12:03

## 2022-04-18 RX ADMIN — Medication 10 MILLIEQUIVALENT(S): at 12:03

## 2022-04-18 RX ADMIN — Medication 2: at 07:09

## 2022-04-18 RX ADMIN — HEPARIN SODIUM 5000 UNIT(S): 5000 INJECTION INTRAVENOUS; SUBCUTANEOUS at 15:10

## 2022-04-18 RX ADMIN — Medication 81 MILLIGRAM(S): at 12:03

## 2022-04-18 RX ADMIN — ATORVASTATIN CALCIUM 80 MILLIGRAM(S): 80 TABLET, FILM COATED ORAL at 21:28

## 2022-04-18 RX ADMIN — HEPARIN SODIUM 5000 UNIT(S): 5000 INJECTION INTRAVENOUS; SUBCUTANEOUS at 05:14

## 2022-04-18 RX ADMIN — Medication 125 MICROGRAM(S): at 05:14

## 2022-04-18 RX ADMIN — Medication 25 MILLIGRAM(S): at 21:29

## 2022-04-18 RX ADMIN — ISOSORBIDE MONONITRATE 30 MILLIGRAM(S): 60 TABLET, EXTENDED RELEASE ORAL at 21:28

## 2022-04-18 RX ADMIN — Medication 6: at 18:07

## 2022-04-18 RX ADMIN — LISINOPRIL 40 MILLIGRAM(S): 2.5 TABLET ORAL at 05:14

## 2022-04-18 RX ADMIN — Medication 2: at 21:42

## 2022-04-18 RX ADMIN — INSULIN GLARGINE 37 UNIT(S): 100 INJECTION, SOLUTION SUBCUTANEOUS at 21:28

## 2022-04-18 RX ADMIN — AMLODIPINE BESYLATE 10 MILLIGRAM(S): 2.5 TABLET ORAL at 15:10

## 2022-04-18 RX ADMIN — Medication 6 UNIT(S): at 18:07

## 2022-04-18 RX ADMIN — PANTOPRAZOLE SODIUM 40 MILLIGRAM(S): 20 TABLET, DELAYED RELEASE ORAL at 05:14

## 2022-04-18 RX ADMIN — SODIUM CHLORIDE 75 MILLILITER(S): 9 INJECTION INTRAMUSCULAR; INTRAVENOUS; SUBCUTANEOUS at 18:30

## 2022-04-18 NOTE — PROGRESS NOTE ADULT - SUBJECTIVE AND OBJECTIVE BOX
INTERVAL HPI/OVERNIGHT EVENTS:    Patient is a 67y old  Female who presents with a chief complaint of CHEST PAIN     (17 Apr 2022 13:32)      Pt reports the following symptoms:    CONSTITUTIONAL:  Negative fever or chills, feels well, good appetite  EYES:  Negative  blurry vision or double vision  CARDIOVASCULAR:  Negative for chest pain or palpitations  RESPIRATORY:  Negative for cough, wheezing, or SOB   GASTROINTESTINAL:  Negative for nausea, vomiting, diarrhea, constipation, or abdominal pain  GENITOURINARY:  Negative frequency, urgency or dysuria  NEUROLOGIC:  No headache, confusion, dizziness, lightheadedness    MEDICATIONS  (STANDING):  amLODIPine   Tablet 10 milliGRAM(s) Oral every 24 hours  aspirin enteric coated 81 milliGRAM(s) Oral daily  atorvastatin 80 milliGRAM(s) Oral at bedtime  dextrose 5%. 1000 milliLiter(s) (50 mL/Hr) IV Continuous <Continuous>  dextrose 50% Injectable 25 Gram(s) IV Push once  glucagon  Injectable 1 milliGRAM(s) IntraMuscular once  heparin   Injectable 5000 Unit(s) SubCutaneous every 8 hours  insulin glargine Injectable (LANTUS) 37 Unit(s) SubCutaneous at bedtime  insulin lispro (ADMELOG) corrective regimen sliding scale   SubCutaneous three times a day before meals  insulin lispro Injectable (ADMELOG) 6 Unit(s) SubCutaneous three times a day before meals  isosorbide   mononitrate ER Tablet (IMDUR) 30 milliGRAM(s) Oral daily  levothyroxine 125 MICROGram(s) Oral daily  lisinopril 40 milliGRAM(s) Oral every 24 hours  metoprolol tartrate 25 milliGRAM(s) Oral two times a day  pantoprazole    Tablet 40 milliGRAM(s) Oral before breakfast  PARoxetine 40 milliGRAM(s) Oral daily    MEDICATIONS  (PRN):  dextrose Oral Gel 15 Gram(s) Oral once PRN Blood Glucose LESS THAN 70 milliGRAM(s)/deciliter      PHYSICAL EXAM  Vital Signs Last 24 Hrs  T(C): 36.3 (18 Apr 2022 09:15), Max: 36.8 (17 Apr 2022 14:34)  T(F): 97.3 (18 Apr 2022 09:15), Max: 98.2 (17 Apr 2022 14:34)  HR: 54 (18 Apr 2022 12:02) (54 - 70)  BP: 104/53 (18 Apr 2022 12:02) (103/52 - 115/58)  BP(mean): --  RR: 16 (18 Apr 2022 12:02) (16 - 18)  SpO2: 97% (18 Apr 2022 12:02) (93% - 98%)    Constitutional: wn/wd in NAD.   HEENT: NCAT, MMM, OP clear, EOMI, , no proptosis or lid retraction  Neck: no thyromegaly or palpable thyroid nodules   Respiratory: lungs CTAB.  Cardiovascular: regular rhythm, normal S1 and S2, no audible murmurs, no peripheral edema  GI: soft, NT/ND, no masses/HSM appreciated.  Neurology: no tremors, DTR 2+  Skin: no visible rashes/lesions  Psychiatric: AAO x 3, normal affect/mood.    LABS:                        13.0   7.82  )-----------( 239      ( 18 Apr 2022 07:20 )             39.0     04-18    141  |  106  |  11  ----------------------------<  175<H>  3.9   |  26  |  0.66    Ca    8.9      18 Apr 2022 07:20  Mg     2.2     04-18          Thyroid Stimulating Hormone, Serum: 0.221 uIU/mL (04-17 @ 06:41)  Thyroid Stimulating Hormone, Serum: <0.118 uIU/mL (04-15 @ 19:05)      HbA1C:         Insulin Sliding Scale requirements X 24 Hours:      RADIOLOGY & ADDITIONAL TESTS:      A/P:66 yo F with PMH DM, HTN, HLD, depression who presents with CC of chest pain and back pain. Admitted for further workup and monitoring to cardiac telemetry given extensive family hx and other risk factors. CCTA 4/18/22.  A1c: 10.4%  Wt:70kg  Cr:  GFR:  TSH: <0.118    1.  DM type 2  Please continue lantus  37    units at night.    Please continue lispro  6   units before each meal.    Please continue lispro moderate dose sliding scale four times daily with meals and at bedtime    Pt's fingerstick glucose goal is 100-180    2. Hypothyroidism-hx of thyroidectomy   on 150mcg Levothyroxine at home   TSH suppressed  free thyroxine level :1.56  repeat TSH: 0.22  continue with Levothyroxine to 125mcg daily       Will continue to monitor     For discharge, pt can continue TBD    Pt can follow up at discharge with [facility] [place] [facility] by calling  to make an appointment.   Will discuss case with Dr. Martínez and update primary team     INTERVAL HPI/OVERNIGHT EVENTS:    Patient is a 67y old  Female who presents with a chief complaint of CHEST PAIN  Patient had CCTA today, will be going for cardiac cath tomorrow. Ordered chipotle for dinner.    (17 Apr 2022 13:32)      Pt reports the following symptoms:    CONSTITUTIONAL:  Negative fever or chills, feels well, good appetite  EYES:  Negative  blurry vision or double vision  CARDIOVASCULAR:  Negative for chest pain or palpitations  RESPIRATORY:  Negative for cough, wheezing, or SOB   GASTROINTESTINAL:  Negative for nausea, vomiting, diarrhea, constipation, or abdominal pain  GENITOURINARY:  Negative frequency, urgency or dysuria  NEUROLOGIC:  No headache, confusion, dizziness, lightheadedness    MEDICATIONS  (STANDING):  amLODIPine   Tablet 10 milliGRAM(s) Oral every 24 hours  aspirin enteric coated 81 milliGRAM(s) Oral daily  atorvastatin 80 milliGRAM(s) Oral at bedtime  dextrose 5%. 1000 milliLiter(s) (50 mL/Hr) IV Continuous <Continuous>  dextrose 50% Injectable 25 Gram(s) IV Push once  glucagon  Injectable 1 milliGRAM(s) IntraMuscular once  heparin   Injectable 5000 Unit(s) SubCutaneous every 8 hours  insulin glargine Injectable (LANTUS) 37 Unit(s) SubCutaneous at bedtime  insulin lispro (ADMELOG) corrective regimen sliding scale   SubCutaneous three times a day before meals  insulin lispro Injectable (ADMELOG) 6 Unit(s) SubCutaneous three times a day before meals  isosorbide   mononitrate ER Tablet (IMDUR) 30 milliGRAM(s) Oral daily  levothyroxine 125 MICROGram(s) Oral daily  lisinopril 40 milliGRAM(s) Oral every 24 hours  metoprolol tartrate 25 milliGRAM(s) Oral two times a day  pantoprazole    Tablet 40 milliGRAM(s) Oral before breakfast  PARoxetine 40 milliGRAM(s) Oral daily    MEDICATIONS  (PRN):  dextrose Oral Gel 15 Gram(s) Oral once PRN Blood Glucose LESS THAN 70 milliGRAM(s)/deciliter      PHYSICAL EXAM  Vital Signs Last 24 Hrs  T(C): 36.3 (18 Apr 2022 09:15), Max: 36.8 (17 Apr 2022 14:34)  T(F): 97.3 (18 Apr 2022 09:15), Max: 98.2 (17 Apr 2022 14:34)  HR: 54 (18 Apr 2022 12:02) (54 - 70)  BP: 104/53 (18 Apr 2022 12:02) (103/52 - 115/58)  BP(mean): --  RR: 16 (18 Apr 2022 12:02) (16 - 18)  SpO2: 97% (18 Apr 2022 12:02) (93% - 98%)    Constitutional: NAD.   HEENT: NCAT, MMM, OP clear, EOMI, , no proptosis or lid retraction  Neck: no thyromegaly or palpable thyroid nodules   Respiratory: lungs CTAB.  Cardiovascular: regular rhythm, normal S1 and S2, no audible murmurs, no peripheral edema  GI: soft, NT/ND, no masses/HSM appreciated.  Neurology: no tremors, DTR 2+  Skin: no visible rashes/lesions  Psychiatric: AAO x 3, normal affect/mood.    LABS:                        13.0   7.82  )-----------( 239      ( 18 Apr 2022 07:20 )             39.0     04-18    141  |  106  |  11  ----------------------------<  175<H>  3.9   |  26  |  0.66    Ca    8.9      18 Apr 2022 07:20  Mg     2.2     04-18          Thyroid Stimulating Hormone, Serum: 0.221 uIU/mL (04-17 @ 06:41)  Thyroid Stimulating Hormone, Serum: <0.118 uIU/mL (04-15 @ 19:05)      HbA1C:         Insulin Sliding Scale requirements X 24 Hours:      RADIOLOGY & ADDITIONAL TESTS:      A/P:68 yo F with PMH DM, HTN, HLD, depression who presents with CC of chest pain and back pain. Admitted for further workup and monitoring to cardiac telemetry given extensive family hx and other risk factors. CCTA 4/18/22.  A1c: 10.4%  Wt:70kg  Cr:  GFR:  TSH: <0.118    1.  DM type 2  Please continue lantus  37    units at night.    Please continue lispro  6   units before each meal.    Please continue lispro moderate dose sliding scale four times daily with meals and at bedtime    Pt's fingerstick glucose goal is 100-180    2. Hypothyroidism-hx of thyroidectomy   on 150mcg Levothyroxine at home   TSH suppressed  free thyroxine level :1.56  repeat TSH: 0.22  continue with Levothyroxine to 125mcg daily       Will continue to monitor     For discharge, pt can continue TBD    Pt can follow up at discharge with [facility] by calling  to make an appointment.   Will discuss case with Dr. Martínez and update primary team

## 2022-04-18 NOTE — PROGRESS NOTE ADULT - PROBLEM SELECTOR PLAN 1
--CP free and hemodynamically stable at this time; Trop T (-) x3; EKG non-ischemic.    --Last stress test 2018 @ [facility] [place].   --CTA C/A/P (4/15/22): no aortic disseciton or aneurysm, no change 4mm nodule R middle lobe, mild centrilobular emphysema.    --TTE (4/16/22): LVEF 70%, mild sLVH, no significant valvular disease.    --PLAN: NPO after MN for CCTA tomorrow 4/18/22; further plan pending CCTA results. --CP free and hemodynamically stable at this time; Trop T (-) x3; EKG non-ischemic.    --Last stress test 2018 @ [place].   --CTA C/A/P (4/15/22): no aortic dissection or aneurysm, no change 4mm nodule R middle lobe, mild centrilobular emphysema.    --TTE (4/16/22): LVEF 70%, mild sLVH, no significant valvular disease.    --CCTA 4/18/22: calcium score is severe at 678 Agatston units, Severe ostial stenosis of the first diagonal branch, pCA has mild to moderate stenosis, Less than 25% stenosis of the left main. Nonobstructive coronary artery disease in the remaining segments.  --NPO after MN for cardiac cath 4/19 with Dr. Rojas  --Continue Aspirin 81mg PO QD, Lipitor 80mg PO QD, Plavix load ordered for AM

## 2022-04-18 NOTE — PROGRESS NOTE ADULT - SUBJECTIVE AND OBJECTIVE BOX
INCOMPLETE  S: Pt seen and examined bedside.  Patient denies C/P, SOB, N/V, dizziness, palpitations, and diaphoresis.  Pt denies fever/chills, dysuria, abdominal pain, diarrhea, and cough  12 Point ROS otherwise negative except as per HPI/subjective.     O: Vital Signs Last 24 Hrs  T(C): 36.6 (18 Apr 2022 13:52), Max: 36.7 (17 Apr 2022 18:06)  T(F): 97.8 (18 Apr 2022 13:52), Max: 98.1 (17 Apr 2022 18:06)  HR: 62 (18 Apr 2022 15:26) (54 - 70)  BP: 109/57 (18 Apr 2022 15:26) (104/53 - 115/58)  BP(mean): --  RR: 16 (18 Apr 2022 15:26) (16 - 18)  SpO2: 98% (18 Apr 2022 15:26) (93% - 98%)    PHYSICAL EXAM:  GEN: NAD  HEENT: No JVD  PULM:  CTA B/L  CARD:  RRR, S1 and S2   ABD: +BS, NT, soft/ND	  EXT: No Edema B/L LE  NEURO: A+Ox3, no focal deficit  PSYCH: Mood Appropriate    LABS:                        13.0   7.82  )-----------( 239      ( 18 Apr 2022 07:20 )             39.0     04-18    141  |  106  |  11  ----------------------------<  175<H>  3.9   |  26  |  0.66    Ca    8.9      18 Apr 2022 07:20  Mg     2.2     04-18        Troponin T, Serum: <0.01 ng/mL (04-16-22 @ 10:44)  Troponin T, Serum: 0.01 ng/mL (04-16-22 @ 06:55)  Troponin T, Serum: 0.01 ng/mL (04-15-22 @ 19:05)      04-17 @ 07:01  -  04-18 @ 07:00  --------------------------------------------------------  IN: 600 mL / OUT: 0 mL / NET: 600 mL    04-18 @ 07:01  -  04-18 @ 16:54  --------------------------------------------------------  IN: 0 mL / OUT: 0 mL / NET: 0 mL      Daily     Daily    S: Pt seen and examined bedside. Pt reports she is feeling well today, she slept well and denies CP.   Patient denies C/P, SOB, N/V, dizziness, palpitations, and diaphoresis.  Pt denies fever/chills, dysuria, abdominal pain, diarrhea, and cough  12 Point ROS otherwise negative except as per HPI/subjective.     O: Vital Signs Last 24 Hrs  T(C): 36.6 (18 Apr 2022 13:52), Max: 36.7 (17 Apr 2022 18:06)  T(F): 97.8 (18 Apr 2022 13:52), Max: 98.1 (17 Apr 2022 18:06)  HR: 62 (18 Apr 2022 15:26) (54 - 70)  BP: 109/57 (18 Apr 2022 15:26) (104/53 - 115/58)  BP(mean): --  RR: 16 (18 Apr 2022 15:26) (16 - 18)  SpO2: 98% (18 Apr 2022 15:26) (93% - 98%)    PHYSICAL EXAM:  Appearance: Laying in hospital bed in NAD	  HEENT:  EOMI	  Neck:  - JVD  Cardiovascular: Normal S1 S2, No murmurs  Respiratory: CTA B/L, no w/r/r	  Gastrointestinal:  Soft, Non-tender, + BS	x4  Skin: No rashes, No ecchymoses, No cyanosis  Extremities: No pedal edema B/L  Vascular: DP 1+ B/L, PT faint B/L  Neurologic: Non-focal  Psychiatry: A & O x 3, Mood & affect appropriate    LABS:                        13.0   7.82  )-----------( 239      ( 18 Apr 2022 07:20 )             39.0     04-18    141  |  106  |  11  ----------------------------<  175<H>  3.9   |  26  |  0.66    Ca    8.9      18 Apr 2022 07:20  Mg     2.2     04-18        Troponin T, Serum: <0.01 ng/mL (04-16-22 @ 10:44)  Troponin T, Serum: 0.01 ng/mL (04-16-22 @ 06:55)  Troponin T, Serum: 0.01 ng/mL (04-15-22 @ 19:05)      04-17 @ 07:01  -  04-18 @ 07:00  --------------------------------------------------------  IN: 600 mL / OUT: 0 mL / NET: 600 mL    04-18 @ 07:01  -  04-18 @ 16:54  --------------------------------------------------------  IN: 0 mL / OUT: 0 mL / NET: 0 mL      Daily     Daily

## 2022-04-18 NOTE — PROGRESS NOTE ADULT - PROBLEM SELECTOR PLAN 6
--On Levothyroxine 150mcg PO QD at home.    --Per Endo recs, DECREASE to Levothyroxine 125mcg PO QD.      VTE ppx: SQH   Dispo: pending results of CCTA  PT Eval: pending --On Levothyroxine 150mcg PO QD at home.    --Per Endo recs, DECREASE to Levothyroxine 125mcg PO QD.      VTE ppx: SQH held pending cath   Dispo: pending cath   PT Eval: pending  Case d/w Dr. Fenrandez

## 2022-04-18 NOTE — PROGRESS NOTE ADULT - ATTENDING COMMENTS
The patient was admitted for telemetry because of chest pain. She had CT Angio and is to be stented tomorrow. Her TSH was less than 0.18  on 150 ucg Levothyroxine which has now been reduced to 125 ucg. Glucoses were 187-128 last night and today 154-107.I agree with dose of 37 units Lantus and 6 units pre-meal Lispro Insulin.

## 2022-04-18 NOTE — DISCHARGE NOTE PROVIDER - CARE PROVIDER_API CALL
Jean-Pierre Chacon (MD)  Cardiovascular Disease; Internal Medicine  Cardiology [facility], [address] [place] [address]  Phone: [phone]  Fax: [phone]  Follow Up Time: 1 week   Jean-Pierre Chacon)  Cardiovascular Disease; Internal Medicine  Cardiology [facility], [address] [place] [address]  Phone: [phone]  Fax: [phone]  Follow Up Time: 1 week    [facility],   Phone: [phone]  Fax: (   )    -  Follow Up Time: 2 weeks

## 2022-04-18 NOTE — DISCHARGE NOTE PROVIDER - NSDCMRMEDTOKEN_GEN_ALL_CORE_FT
aspirin 81 mg oral tablet: 1 tab(s) orally once a day  clonazePAM 2 mg oral tablet: 1 tab(s) orally 3 times a day, As Needed  Crestor 40 mg oral tablet: 1 tab(s) orally once a day (at bedtime)  Lantus 100 units/mL subcutaneous solution: 50 unit(s) subcutaneous once a day (AM)  levothyroxine 150 mcg (0.15 mg) oral tablet: 1 tab(s) orally once a day  lisinopril: 40  mg orally once a day  Metoprolol Succinate ER 25 mg oral tablet, extended release: 1 tab(s) orally once a day  Norvasc 10 mg oral tablet: 1 tab(s) orally once a day  NovoLOG 100 units/mL subcutaneous solution: 8 -10 unit(s) subcutaneous 3 times a day before meals  omeprazole 20 mg oral delayed release capsule: 1 cap(s) orally once a day  PARoxetine 40 mg oral tablet: 1 tab(s) orally once a day   clonazePAM 2 mg oral tablet: 1 tab(s) orally 3 times a day, As Needed  Crestor 40 mg oral tablet: 1 tab(s) orally once a day (at bedtime)  Lantus 100 units/mL subcutaneous solution: 50 unit(s) subcutaneous once a day (AM)  lisinopril: 40  mg orally once a day  Metoprolol Succinate ER 25 mg oral tablet, extended release: 1 tab(s) orally once a day  Norvasc 10 mg oral tablet: 1 tab(s) orally once a day  NovoLOG 100 units/mL subcutaneous solution: 8 -12 unit(s) subcutaneous 3 times a day before meals  omeprazole 20 mg oral delayed release capsule: 1 cap(s) orally once a day  PARoxetine 40 mg oral tablet: 1 tab(s) orally once a day   aspirin 81 mg oral delayed release tablet: 1 tab(s) orally once a day  cardiac rehab: We have provided you with a prescription for cardiac rehab which is medically supervised exercise program for your heart and has been shown to improve the quantity and quality of life of people with heart disease like yours. You should attend cardiac rehab 3 times per week for 12 weeks. We have provided you with a list of nearby facilities. Please call your insurance carrier to determine which of these facilities are covered under your plan. Please bring this prescription with you to your follow up appointment with your cardiologist who can then further assist you to enroll into a cardiac rehab program.    clonazePAM 2 mg oral tablet: 1 tab(s) orally 3 times a day, As Needed  clopidogrel 75 mg oral tablet: 1 tab(s) orally once a day  Crestor 40 mg oral tablet: 1 tab(s) orally once a day (at bedtime)  isosorbide mononitrate 30 mg oral tablet, extended release: 1 tab(s) orally once a day  Lantus 100 units/mL subcutaneous solution: 50 unit(s) subcutaneous once a day (AM)  levothyroxine 125 mcg (0.125 mg) oral tablet: 1 tab(s) orally once a day  lisinopril: 40  mg orally once a day  Metoprolol Succinate ER 25 mg oral tablet, extended release: 1 tab(s) orally once a day  Norvasc 10 mg oral tablet: 1 tab(s) orally once a day  NovoLOG 100 units/mL subcutaneous solution: 8-12 unit(s) subcutaneous 3 times a day (after meals)  omeprazole 20 mg oral delayed release capsule: 1 cap(s) orally once a day  PARoxetine 40 mg oral tablet: 1 tab(s) orally once a day

## 2022-04-18 NOTE — DISCHARGE NOTE PROVIDER - NSDCCPCAREPLAN_GEN_ALL_CORE_FT
PRINCIPAL DISCHARGE DIAGNOSIS  Diagnosis: CAD (coronary artery disease)  Assessment and Plan of Treatment: You were found to have blockages in the arteries of your heart, also known as Coronary Artery Disease. You underwent a cardiac catheterization on 4/19/2022 and received a stent to the distal circumflex artery.  PLEASE CONTINUE ASPIRIN 81MG DAILY AND PLAVIX 75MG DAILY. DO NOT STOP THESE MEDICATIONS FOR ANY REASON AS THEY ARE KEEPING YOUR STENT OPEN AND PREVENTING A HEART ATTACK.   Avoid strenuous activity or heavy lifting anything more than 5lbs for the next five days. Do not take a bath or swim for the next five days; you may shower. For any bleeding or hematoma formation (hardened blood collection under the skin) at the access site of your right groin please hold pressure and go to the emergency room. Please follow up with Dr. Chacon in 1-2 weeks. For recurrent chest pain, please call your doctor or go to the emergency room.        SECONDARY DISCHARGE DIAGNOSES  Diagnosis: HTN (hypertension)  Assessment and Plan of Treatment: Please continue  as listed to keep your blood pressure controlled. For blood pressure that is too high or too low please see your doctor or go to the emergency room as necessary.      Diagnosis: DM (diabetes mellitus), type 2  Assessment and Plan of Treatment:     Diagnosis: HLD (hyperlipidemia)  Assessment and Plan of Treatment:      PRINCIPAL DISCHARGE DIAGNOSIS  Diagnosis: CAD (coronary artery disease)  Assessment and Plan of Treatment: You were found to have blockages in the arteries of your heart, also known as Coronary Artery Disease. You underwent a cardiac catheterization on 4/19/2022 and received a stent to the distal circumflex artery.  PLEASE CONTINUE ASPIRIN 81MG DAILY AND PLAVIX 75MG DAILY. DO NOT STOP THESE MEDICATIONS FOR ANY REASON AS THEY ARE KEEPING YOUR STENT OPEN AND PREVENTING A HEART ATTACK.   Avoid strenuous activity or heavy lifting anything more than 5lbs for the next five days. Do not take a bath or swim for the next five days; you may shower. For any bleeding or hematoma formation (hardened blood collection under the skin) at the access site of your right groin please hold pressure and go to the emergency room. Please follow up with Dr. Chacon in 1-2 weeks. For recurrent chest pain, please call your doctor or go to the emergency room.        SECONDARY DISCHARGE DIAGNOSES  Diagnosis: HTN (hypertension)  Assessment and Plan of Treatment: Please continue Amlodpine 10mg Daily, Lisinopril 40mg Daily, Imdur 30mg Daily, and Troprol XL 25mg Daily  as listed to keep your blood pressure controlled. For blood pressure that is too high or too low please see your doctor or go to the emergency room as necessary.      Diagnosis: DM (diabetes mellitus), type 2  Assessment and Plan of Treatment: Your Hemoglobin A1c is 10.4% and your goal A1c is less than 7.0%. This number measures your average blood sugar level over the last three months.  Please continue Novolog as listed for diabetes. Maintain a low carbohydrate, low sugar diet, exercise, monitor your fingerstick blood sugars regarly and follow up with your Endocrinologist/Primary Care Doctor.      Diagnosis: HLD (hyperlipidemia)  Assessment and Plan of Treatment: Please continue Crestor 40mg at bedtime to keep your cholesterol low. High cholesterol contributes to heart disease.      Diagnosis: Hypothyroid  Assessment and Plan of Treatment: Your Levothyroxine 150mcg daily have been decrease to 125mcg. Please continue Levothyroxine 125mch daily     PRINCIPAL DISCHARGE DIAGNOSIS  Diagnosis: CAD (coronary artery disease)  Assessment and Plan of Treatment: You were found to have blockages in the arteries of your heart, also known as Coronary Artery Disease. You underwent a cardiac catheterization on 4/19/2022 and received a stent to the distal circumflex artery.  PLEASE CONTINUE ASPIRIN 81MG DAILY AND PLAVIX 75MG DAILY. DO NOT STOP THESE MEDICATIONS FOR ANY REASON AS THEY ARE KEEPING YOUR STENT OPEN AND PREVENTING A HEART ATTACK.   Avoid strenuous activity or heavy lifting anything more than 5lbs for the next five days. Do not take a bath or swim for the next five days; you may shower. For any bleeding or hematoma formation (hardened blood collection under the skin) at the access site of your right groin please hold pressure and go to the emergency room. Please follow up with Dr. Chacon in 1-2 weeks. For recurrent chest pain, please call your doctor or go to the emergency room.        SECONDARY DISCHARGE DIAGNOSES  Diagnosis: HTN (hypertension)  Assessment and Plan of Treatment: Please continue Amlodpine 10mg Daily, Lisinopril 40mg Daily, Imdur 30mg Daily, and Troprol XL 25mg Daily  as listed to keep your blood pressure controlled. For blood pressure that is too high or too low please see your doctor or go to the emergency room as necessary.      Diagnosis: DM (diabetes mellitus), type 2  Assessment and Plan of Treatment: Your Hemoglobin A1c is 10.4% and your goal A1c is less than 7.0%. This number measures your average blood sugar level over the last three months.  Please continue Novolog 8-12 unit three times a day before meal and lantus 50 units as listed for diabetes. Maintain a low carbohydrate, low sugar diet, exercise, monitor your fingerstick blood sugars regarly and follow up with [facility] by calling  to make an appointment.      Diagnosis: HLD (hyperlipidemia)  Assessment and Plan of Treatment: Please continue Crestor 40mg at bedtime to keep your cholesterol low. High cholesterol contributes to heart disease.      Diagnosis: Hypothyroid  Assessment and Plan of Treatment: Your Levothyroxine 150mcg daily have been decrease to 125mcg. Please continue Levothyroxine 125mcg daily

## 2022-04-18 NOTE — DISCHARGE NOTE PROVIDER - PROVIDER TOKENS
PROVIDER:[TOKEN:[[id number]],FOLLOWUP:[1 week]] PROVIDER:[TOKEN:[[id number]],FOLLOWUP:[1 week]],FREE:[LAST:[[facility]],PHONE:[phone]],FAX:[(   )    -],FOLLOWUP:[2 weeks]]

## 2022-04-18 NOTE — DISCHARGE NOTE PROVIDER - HOSPITAL COURSE
INCOMPLETE    66 yo F with PMH DM, HTN, HLD, depression who presents with CC of chest pain and back pain. Pain in chest started yesterday but was intermittent and she has chronic pain in her back generally. However, this morning she woke up and felt her whole right leg was numb. Her back pain and chest pain also increased in severity and became more constant. Chest pain was on both the left and right side (rated 6/10), while back pain was mid back and radiated down her spine (rated 9/10.). Patient took motrin yesterday which did not really help. Today she just took her BP meds and aspirin but nothing else. Daughter Mariposa brought patient in because the pain was not going away. Patient felt palpitations and nausea. States numbness in her leg went away with pain medication she got in ED. ED course: VSS except tachycardia, troponin (-), other labs wnl, CT chest/abdom-No aortic dissection or aneurysm. No acute abnormality, CT head (-), ekg NSR    CCTA 4/18/22:     Pt was admitted to [address] [place] [address] overnight for observation. Today pt was seen and examined at bedside, denies complaints of chest pain, dizziness, SOB, palpitations, pain, LE edema, fever, chills. No events on tele overnight, VSS, Labs unremarkable/stable, Physical exam WNL. Pt was seen and examined by cardiology attending as well and is deemed stable for discharge per .__ Pt is to continue ASA/Plavix, Atorvastatin. Pt is to follow up with cardiologist __ in 1-2 weeks for post discharge check-up. All medications needing refills were e-prescribed to pt’s preferred pharmacy.   INCOMPLETE    68 yo F with PMH DM, HTN, HLD, depression who presents with CC of chest pain and back pain. Pain in chest started yesterday but was intermittent and she has chronic pain in her back generally. However, this morning she woke up and felt her whole right leg was numb. Her back pain and chest pain also increased in severity and became more constant. Chest pain was on both the left and right side (rated 6/10), while back pain was mid back and radiated down her spine (rated 9/10.). Patient took motrin yesterday which did not really help. Today she just took her BP meds and aspirin but nothing else. Daughter Mariposa brought patient in because the pain was not going away. Patient felt palpitations and nausea. States numbness in her leg went away with pain medication she got in ED. ED course: VSS except tachycardia, troponin (-), other labs wnl, CT chest/abdom-No aortic dissection or aneurysm. No acute abnormality, CT head (-), ekg NSR. Pt was admitted to [address] [place] [address] overnight for observation.    ECHO 4/16/2022: LVEF 70%, mild sLVH, no significant valvular disease. CCTA 4/18/22: calcium scores 678, severe ostial stenosis of the first diagonal branch. Prox RCA has mild to moderate stenosis, <25% stenosis of the LM. Pt underwent cardiac cath 4/19/2022 DOMO x1 dLCx; LM no significant dz, mLAD 20%, ostialD1 90% (small), RCA w/ LI, pRCA 20%; LVEF 65%, LVEDP 8mmHg. Access site R groin PC. No hematoma or bleeding.     Today pt was seen and examined at bedside, denies complaints of chest pain, dizziness, SOB, palpitations, pain, LE edema, fever, chills. No events on tele overnight, VSS, Labs unremarkable/stable, Physical exam WNL. Pt was seen and examined by cardiology attending as well and is deemed stable for discharge per Dr. Gaston. Pt is to continue ASA/Plavix, Atorvastatin. Pt is to follow up with cardiologist Dr. Chacon in 1-2 weeks for post discharge check-up. Pt is to follow up [facility] for post-discharge diabetic follow up. All medications needing refills were e-prescribed to pt’s preferred pharmacy. INCOMPLETE    68 yo F with PMH DM, HTN, HLD, depression who presents with CC of chest pain and back pain. Pain in chest started yesterday but was intermittent and she has chronic pain in her back generally. However, this morning she woke up and felt her whole right leg was numb. Her back pain and chest pain also increased in severity and became more constant. Chest pain was on both the left and right side (rated 6/10), while back pain was mid back and radiated down her spine (rated 9/10.). Patient took motrin yesterday which did not really help. Today she just took her BP meds and aspirin but nothing else. Daughter Mariposa brought patient in because the pain was not going away. Patient felt palpitations and nausea. States numbness in her leg went away with pain medication she got in ED. ED course: VSS except tachycardia, troponin (-), other labs wnl, CT chest/abdom-No aortic dissection or aneurysm. No acute abnormality, CT head (-), ekg NSR. Pt was admitted to [address] [place] [address] overnight for observation.    ECHO 4/16/2022: LVEF 70%, mild sLVH, no significant valvular disease. CCTA 4/18/22: calcium scores 678, severe ostial stenosis of the first diagonal branch. Prox RCA has mild to moderate stenosis, <25% stenosis of the LM. Pt underwent cardiac cath 4/19/2022 DOMO x1 dLCx; LM no significant dz, mLAD 20%, ostialD1 90% (small), RCA w/ LI, pRCA 20%; LVEF 65%, LVEDP 8mmHg. Access site R groin PC. No hematoma or bleeding.     Today pt was seen and examined at bedside, denies complaints of chest pain, dizziness, SOB, palpitations, pain, LE edema, fever, chills. No events on tele overnight, VSS, Labs unremarkable/stable, Physical exam WNL. Pt was seen and examined by cardiology attending as well and is deemed stable for discharge per Dr. Gaston. Pt is to continue ASA/Plavix, Atorvastatin. Pt is to follow up with cardiologist Dr. Chacon in 1-2 weeks for post discharge check-up. Pt is to follow up [facility] for post-discharge diabetic follow up. All medications needing refills were e-prescribed to pt’s preferred pharmacy.     VS Stable  Gen: NAD, A&O x3  Cards: RRR, clear S1 and S2 without murmur  Pulm: CTA B/L without w/r/r  Right groin: No hematoma or ooze, distal pulses intact  Abd: soft, NT  Ext: no LE edema or ulcerations B/L

## 2022-04-18 NOTE — PROGRESS NOTE ADULT - ASSESSMENT
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 Trop T (-) x3 and EKG non-ischemic. TTE w/ preserved EF. CTA chest w/p aortic dissection/aneurysm. Last stress test 2018. Plan for ischemic eval w/ CCTA tomorrow Monday 4/18/22.   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 Trop T (-) x3 and EKG non-ischemic. TTE w/ preserved EF. CTA chest w/p aortic dissection/aneurysm. Last stress test 2018. Plan for ischemic eval w/ CCTA tomorrow Monday 4/18/22.      Precath Checklist  Shellfish/Contrast Allergies?  No    COVID Result within 48-72hours:   COVID-19 PCR: Negative (04-15-22 @ 19:05)    COVID VACCINATED: YES     ASA II        Mallampati class: II	            Anginal Class: IV      Sedation Plan:   [  ] None   [X] Moderate   [  ]  Deep    [  ]  General Anesthesia   Patient Is Suitable Candidate For Sedation?     [X] Yes   [  ] No   [  ] Not Applicable   Cath Order Entered: [X] Yes  DAPT LOAD Ordered: [X] Yes    Pre-Cath fluids Ordered: [X] Yes      Risks Benefits Annotation:     CARDIAC CATH: Risks & benefits of procedure and sedation and risks and benefits for the alternative therapy have been explained to the patient and/or HCP in layman’s terms including but not limited to: allergic reaction, bleeding, infection, arrhythmia, respiratory compromise, renal and vascular compromise, limb damage, MI, CVA, emergent CABG/Vascular Surgery and death. Informed consent obtained and in chart. 66yo F, strong FHx of MI and PMHx of HTN, HLD, DM T2, back pain, depression who presented w/ CP associated w/ palpitations/nausea; CP independent of exertion. Trop T (-) x3 and EKG non-ischemic. TTE w/ preserved EF. CTA chest w/p aortic dissection/aneurysm. CCTA with elevated ca score and severe stenosis of D1. Plan for cardiac cath 4/19.     Precath Checklist  Shellfish/Contrast Allergies?  No    COVID Result within 48-72hours:   COVID-19 PCR: Negative (04-15-22 @ 19:05)    COVID VACCINATED: Unknown     ASA II        Mallampati class: II	            Anginal Class: IV    Sedation Plan:   [  ] None   [X] Moderate   [  ]  Deep    [  ]  General Anesthesia   Patient Is Suitable Candidate For Sedation?     [X] Yes   [  ] No   [  ] Not Applicable   Cath Order Entered: [X] Yes  DAPT LOAD Ordered: [X] Yes    Pre-Cath fluids Ordered: [X] Yes      Risks Benefits Annotation:     CARDIAC CATH: Risks & benefits of procedure and sedation and risks and benefits for the alternative therapy have been explained to the patient and/or HCP in layman’s terms including but not limited to: allergic reaction, bleeding, infection, arrhythmia, respiratory compromise, renal and vascular compromise, limb damage, MI, CVA, emergent CABG/Vascular Surgery and death. Informed consent obtained and in chart.

## 2022-04-18 NOTE — DISCHARGE NOTE PROVIDER - CARE PROVIDERS DIRECT ADDRESSES
,[email or web address]

## 2022-04-19 LAB
ANION GAP SERPL CALC-SCNC: 11 MMOL/L — SIGNIFICANT CHANGE UP (ref 5–17)
APTT BLD: 34.6 SEC — SIGNIFICANT CHANGE UP (ref 27.5–35.5)
BASOPHILS # BLD AUTO: 0.02 K/UL — SIGNIFICANT CHANGE UP (ref 0–0.2)
BASOPHILS NFR BLD AUTO: 0.3 % — SIGNIFICANT CHANGE UP (ref 0–2)
BUN SERPL-MCNC: 9 MG/DL — SIGNIFICANT CHANGE UP (ref 7–23)
CALCIUM SERPL-MCNC: 8.6 MG/DL — SIGNIFICANT CHANGE UP (ref 8.4–10.5)
CHLORIDE SERPL-SCNC: 106 MMOL/L — SIGNIFICANT CHANGE UP (ref 96–108)
CO2 SERPL-SCNC: 24 MMOL/L — SIGNIFICANT CHANGE UP (ref 22–31)
CREAT SERPL-MCNC: 0.59 MG/DL — SIGNIFICANT CHANGE UP (ref 0.5–1.3)
EGFR: 99 ML/MIN/1.73M2 — SIGNIFICANT CHANGE UP
EOSINOPHIL # BLD AUTO: 0.18 K/UL — SIGNIFICANT CHANGE UP (ref 0–0.5)
EOSINOPHIL NFR BLD AUTO: 3 % — SIGNIFICANT CHANGE UP (ref 0–6)
GLUCOSE BLDC GLUCOMTR-MCNC: 109 MG/DL — HIGH (ref 70–99)
GLUCOSE BLDC GLUCOMTR-MCNC: 171 MG/DL — HIGH (ref 70–99)
GLUCOSE BLDC GLUCOMTR-MCNC: 247 MG/DL — HIGH (ref 70–99)
GLUCOSE BLDC GLUCOMTR-MCNC: 94 MG/DL — SIGNIFICANT CHANGE UP (ref 70–99)
GLUCOSE SERPL-MCNC: 154 MG/DL — HIGH (ref 70–99)
HCT VFR BLD CALC: 36.6 % — SIGNIFICANT CHANGE UP (ref 34.5–45)
HGB BLD-MCNC: 12.1 G/DL — SIGNIFICANT CHANGE UP (ref 11.5–15.5)
IMM GRANULOCYTES NFR BLD AUTO: 0.2 % — SIGNIFICANT CHANGE UP (ref 0–1.5)
INR BLD: 1.02 — SIGNIFICANT CHANGE UP (ref 0.88–1.16)
LYMPHOCYTES # BLD AUTO: 2.47 K/UL — SIGNIFICANT CHANGE UP (ref 1–3.3)
LYMPHOCYTES # BLD AUTO: 41.7 % — SIGNIFICANT CHANGE UP (ref 13–44)
MAGNESIUM SERPL-MCNC: 2.1 MG/DL — SIGNIFICANT CHANGE UP (ref 1.6–2.6)
MCHC RBC-ENTMCNC: 27.8 PG — SIGNIFICANT CHANGE UP (ref 27–34)
MCHC RBC-ENTMCNC: 33.1 GM/DL — SIGNIFICANT CHANGE UP (ref 32–36)
MCV RBC AUTO: 83.9 FL — SIGNIFICANT CHANGE UP (ref 80–100)
MONOCYTES # BLD AUTO: 0.38 K/UL — SIGNIFICANT CHANGE UP (ref 0–0.9)
MONOCYTES NFR BLD AUTO: 6.4 % — SIGNIFICANT CHANGE UP (ref 2–14)
NEUTROPHILS # BLD AUTO: 2.86 K/UL — SIGNIFICANT CHANGE UP (ref 1.8–7.4)
NEUTROPHILS NFR BLD AUTO: 48.4 % — SIGNIFICANT CHANGE UP (ref 43–77)
NRBC # BLD: 0 /100 WBCS — SIGNIFICANT CHANGE UP (ref 0–0)
PLATELET # BLD AUTO: 204 K/UL — SIGNIFICANT CHANGE UP (ref 150–400)
POTASSIUM SERPL-MCNC: 3.4 MMOL/L — LOW (ref 3.5–5.3)
POTASSIUM SERPL-SCNC: 3.4 MMOL/L — LOW (ref 3.5–5.3)
PROTHROM AB SERPL-ACNC: 12.1 SEC — SIGNIFICANT CHANGE UP (ref 10.5–13.4)
RBC # BLD: 4.36 M/UL — SIGNIFICANT CHANGE UP (ref 3.8–5.2)
RBC # FLD: 12.5 % — SIGNIFICANT CHANGE UP (ref 10.3–14.5)
SODIUM SERPL-SCNC: 141 MMOL/L — SIGNIFICANT CHANGE UP (ref 135–145)
WBC # BLD: 5.92 K/UL — SIGNIFICANT CHANGE UP (ref 3.8–10.5)
WBC # FLD AUTO: 5.92 K/UL — SIGNIFICANT CHANGE UP (ref 3.8–10.5)

## 2022-04-19 PROCEDURE — 99233 SBSQ HOSP IP/OBS HIGH 50: CPT

## 2022-04-19 PROCEDURE — 93458 L HRT ARTERY/VENTRICLE ANGIO: CPT | Mod: 26,59

## 2022-04-19 PROCEDURE — 92933 PRQ TRLML C ATHRC ST ANGIOP1: CPT | Mod: LC

## 2022-04-19 PROCEDURE — 99152 MOD SED SAME PHYS/QHP 5/>YRS: CPT

## 2022-04-19 PROCEDURE — 92978 ENDOLUMINL IVUS OCT C 1ST: CPT | Mod: 26,LC

## 2022-04-19 RX ORDER — SODIUM CHLORIDE 9 MG/ML
500 INJECTION INTRAMUSCULAR; INTRAVENOUS; SUBCUTANEOUS
Refills: 0 | Status: DISCONTINUED | OUTPATIENT
Start: 2022-04-19 | End: 2022-04-20

## 2022-04-19 RX ORDER — POTASSIUM CHLORIDE 20 MEQ
20 PACKET (EA) ORAL ONCE
Refills: 0 | Status: COMPLETED | OUTPATIENT
Start: 2022-04-19 | End: 2022-04-19

## 2022-04-19 RX ORDER — INSULIN GLARGINE 100 [IU]/ML
40 INJECTION, SOLUTION SUBCUTANEOUS AT BEDTIME
Refills: 0 | Status: DISCONTINUED | OUTPATIENT
Start: 2022-04-19 | End: 2022-04-20

## 2022-04-19 RX ORDER — CLOPIDOGREL BISULFATE 75 MG/1
75 TABLET, FILM COATED ORAL DAILY
Refills: 0 | Status: DISCONTINUED | OUTPATIENT
Start: 2022-04-20 | End: 2022-04-20

## 2022-04-19 RX ADMIN — LISINOPRIL 40 MILLIGRAM(S): 2.5 TABLET ORAL at 05:36

## 2022-04-19 RX ADMIN — Medication 2: at 06:48

## 2022-04-19 RX ADMIN — SODIUM CHLORIDE 150 MILLILITER(S): 9 INJECTION INTRAMUSCULAR; INTRAVENOUS; SUBCUTANEOUS at 19:01

## 2022-04-19 RX ADMIN — Medication 2 MILLIGRAM(S): at 00:12

## 2022-04-19 RX ADMIN — Medication 4: at 22:34

## 2022-04-19 RX ADMIN — Medication 40 MILLIGRAM(S): at 11:53

## 2022-04-19 RX ADMIN — Medication 20 MILLIEQUIVALENT(S): at 15:51

## 2022-04-19 RX ADMIN — INSULIN GLARGINE 40 UNIT(S): 100 INJECTION, SOLUTION SUBCUTANEOUS at 22:34

## 2022-04-19 RX ADMIN — Medication 125 MICROGRAM(S): at 05:36

## 2022-04-19 RX ADMIN — Medication 81 MILLIGRAM(S): at 11:53

## 2022-04-19 RX ADMIN — PANTOPRAZOLE SODIUM 40 MILLIGRAM(S): 20 TABLET, DELAYED RELEASE ORAL at 05:36

## 2022-04-19 RX ADMIN — ISOSORBIDE MONONITRATE 30 MILLIGRAM(S): 60 TABLET, EXTENDED RELEASE ORAL at 22:35

## 2022-04-19 RX ADMIN — CLOPIDOGREL BISULFATE 600 MILLIGRAM(S): 75 TABLET, FILM COATED ORAL at 05:36

## 2022-04-19 RX ADMIN — ATORVASTATIN CALCIUM 80 MILLIGRAM(S): 80 TABLET, FILM COATED ORAL at 22:35

## 2022-04-19 NOTE — PROGRESS NOTE ADULT - PROBLEM SELECTOR PLAN 6
--On Levothyroxine 150mcg PO QD at home.    --Per Endo recs, DECREASE to Levothyroxine 125mcg PO QD.      VTE ppx: SQH held pending cath   Dispo: pending cath   PT Eval: pending  Case d/w Dr. Fernandez --On Levothyroxine 150mcg PO QD at home.    --Per Endo recs, DECREASE to Levothyroxine 125mcg PO QD.      VTE ppx: SQH held pending cath   Dispo: pending cath     Case d/w Dr. Fernandez

## 2022-04-19 NOTE — PROGRESS NOTE ADULT - PROBLEM SELECTOR PLAN 1
--CP free and hemodynamically stable at this time; Trop T (-) x3; EKG non-ischemic.    --Last stress test 2018 @ [facility].   --CTA C/A/P (4/15/22): no aortic dissection or aneurysm, no change 4mm nodule R middle lobe, mild centrilobular emphysema.    --TTE (4/16/22): LVEF 70%, mild sLVH, no significant valvular disease.    --CCTA 4/18/22: calcium score is severe at 678 Agatston units, Severe ostial stenosis of the first diagonal branch, pCA has mild to moderate stenosis, Less than 25% stenosis of the left main. Nonobstructive coronary artery disease in the remaining segments.  --NPO after MN for cardiac cath 4/19 with Dr. Rojas  --Continue Aspirin 81mg PO QD, Lipitor 80mg PO QD, Plavix load ordered for AM --CP free and hemodynamically stable at this time; Trop T (-) x3; EKG non-ischemic.    --Last stress test 2018 @ [facility].   --CTA C/A/P (4/15/22): no aortic dissection or aneurysm, no change 4mm nodule R middle lobe, mild centrilobular emphysema.    --TTE (4/16/22): LVEF 70%, mild sLVH, no significant valvular disease.    --CCTA 4/18/22: calcium score is severe at 678 Agatston units, Severe ostial stenosis of the first diagonal branch, pCA has mild to moderate stenosis, Less than 25% stenosis of the left main. Nonobstructive coronary artery disease in the remaining segments.  --Plan for cardiac cath 4/19 with Dr. Harkins  --Continue Aspirin 81mg PO QD, Lipitor 80mg PO QD, s/p Plavix load

## 2022-04-19 NOTE — PROGRESS NOTE ADULT - ASSESSMENT
66 yo F with PMH DM, HTN, HLD, depression who presents with CC of chest pain and back pain. Admitted for further workup and monitoring to cardiac telemetry given extensive family hx and other risk factors. CCTA 4/18/22.  A1c: 10.4%  Wt:70kg  Cr:  GFR:  TSH: <0.118      Will continue to monitor     For discharge, pt can continue TBD    Pt can follow up at discharge with [facility] by calling  to make an appointment.   Will discuss case with Dr. Martínez and update primary tea

## 2022-04-19 NOTE — PROGRESS NOTE ADULT - SUBJECTIVE AND OBJECTIVE BOX
INCOMPLETE  S: Pt seen and examined bedside.  Patient denies C/P, SOB, N/V, dizziness, palpitations, and diaphoresis.  Pt denies fever/chills, dysuria, abdominal pain, diarrhea, and cough  12 Point ROS otherwise negative except as per HPI/subjective.     O: Vital Signs Last 24 Hrs  T(C): 36.3 (19 Apr 2022 10:35), Max: 37.1 (18 Apr 2022 21:56)  T(F): 97.3 (19 Apr 2022 10:35), Max: 98.7 (18 Apr 2022 21:56)  HR: 61 (19 Apr 2022 12:00) (61 - 71)  BP: 110/57 (19 Apr 2022 12:00) (97/54 - 126/60)  BP(mean): 78 (19 Apr 2022 12:00) (72 - 78)  RR: 17 (19 Apr 2022 12:00) (16 - 17)  SpO2: 97% (19 Apr 2022 12:00) (95% - 98%)    PHYSICAL EXAM:  GEN: NAD  HEENT: No JVD  PULM:  CTA B/L  CARD:  RRR, S1 and S2   ABD: +BS, NT, soft/ND	  EXT: No Edema B/L LE  NEURO: A+Ox3, no focal deficit  PSYCH: Mood Appropriate    LABS:                        12.1   5.92  )-----------( 204      ( 19 Apr 2022 09:25 )             36.6     04-19    141  |  106  |  9   ----------------------------<  154<H>  3.4<L>   |  24  |  0.59    Ca    8.6      19 Apr 2022 09:25  Mg     2.1     04-19      PT/INR - ( 19 Apr 2022 09:25 )   PT: 12.1 sec;   INR: 1.02          PTT - ( 19 Apr 2022 09:25 )  PTT:34.6 sec  Troponin T, Serum: <0.01 ng/mL (04-16-22 @ 10:44)  Troponin T, Serum: 0.01 ng/mL (04-16-22 @ 06:55)  Troponin T, Serum: 0.01 ng/mL (04-15-22 @ 19:05)      04-18 @ 07:01  -  04-19 @ 07:00  --------------------------------------------------------  IN: 0 mL / OUT: 0 mL / NET: 0 mL      Daily     Daily    S: Pt seen and examined bedside. Pt reports feeling well this mornig, her BP went a bit low this morning but otherwise she is feeling well.   Patient denies C/P, SOB, N/V, dizziness, palpitations, and diaphoresis.  Pt denies fever/chills, dysuria, abdominal pain, diarrhea, and cough  12 Point ROS otherwise negative except as per HPI/subjective.     O: Vital Signs Last 24 Hrs  T(C): 36.3 (19 Apr 2022 10:35), Max: 37.1 (18 Apr 2022 21:56)  T(F): 97.3 (19 Apr 2022 10:35), Max: 98.7 (18 Apr 2022 21:56)  HR: 61 (19 Apr 2022 12:00) (61 - 71)  BP: 110/57 (19 Apr 2022 12:00) (97/54 - 126/60)  BP(mean): 78 (19 Apr 2022 12:00) (72 - 78)  RR: 17 (19 Apr 2022 12:00) (16 - 17)  SpO2: 97% (19 Apr 2022 12:00) (95% - 98%)    PHYSICAL EXAM:  GEN: NAD  HEENT: No JVD  PULM:  CTA B/L, No WRR  CARD:  RRR, S1 and S2, no murmurs  ABD: +BS, NT, soft/ND	  EXT: No Edema B/L LE, distal pulses intact   NEURO: A+Ox3, no focal deficit  PSYCH: Mood Appropriate    LABS:                        12.1   5.92  )-----------( 204      ( 19 Apr 2022 09:25 )             36.6     04-19    141  |  106  |  9   ----------------------------<  154<H>  3.4<L>   |  24  |  0.59    Ca    8.6      19 Apr 2022 09:25  Mg     2.1     04-19      PT/INR - ( 19 Apr 2022 09:25 )   PT: 12.1 sec;   INR: 1.02          PTT - ( 19 Apr 2022 09:25 )  PTT:34.6 sec  Troponin T, Serum: <0.01 ng/mL (04-16-22 @ 10:44)  Troponin T, Serum: 0.01 ng/mL (04-16-22 @ 06:55)  Troponin T, Serum: 0.01 ng/mL (04-15-22 @ 19:05)      04-18 @ 07:01  -  04-19 @ 07:00  --------------------------------------------------------  IN: 0 mL / OUT: 0 mL / NET: 0 mL      Daily     Daily

## 2022-04-19 NOTE — PROGRESS NOTE ADULT - ASSESSMENT
66yo F, strong FHx of MI and PMHx of HTN, HLD, DM T2, back pain, depression who presented w/ CP associated w/ palpitations/nausea; CP independent of exertion. Trop T (-) x3 and EKG non-ischemic. TTE w/ preserved EF. CTA chest w/p aortic dissection/aneurysm. CCTA with elevated ca score and severe stenosis of D1. Plan for cardiac cath 4/19.     Precath Checklist  Shellfish/Contrast Allergies?  No    COVID Result within 48-72hours:   COVID-19 PCR: Negative (04-15-22 @ 19:05)    COVID VACCINATED: Unknown     ASA II        Mallampati class: II	            Anginal Class: IV    Sedation Plan:   [  ] None   [X] Moderate   [  ]  Deep    [  ]  General Anesthesia   Patient Is Suitable Candidate For Sedation?     [X] Yes   [  ] No   [  ] Not Applicable   Cath Order Entered: [X] Yes  DAPT LOAD Ordered: [X] Yes    Pre-Cath fluids Ordered: [X] Yes      Risks Benefits Annotation:     CARDIAC CATH: Risks & benefits of procedure and sedation and risks and benefits for the alternative therapy have been explained to the patient and/or HCP in layman’s terms including but not limited to: allergic reaction, bleeding, infection, arrhythmia, respiratory compromise, renal and vascular compromise, limb damage, MI, CVA, emergent CABG/Vascular Surgery and death. Informed consent obtained and in chart. 66yo F, strong FHx of MI and PMHx of HTN, HLD, DM T2, back pain, depression who presented w/ CP associated w/ palpitations/nausea; CP independent of exertion. Trop T (-) x3 and EKG non-ischemic. TTE w/ preserved EF. CTA chest w/p aortic dissection/aneurysm. CCTA with elevated Ca score and severe stenosis of D1. Plan for cardiac cath 4/19.

## 2022-04-19 NOTE — PROGRESS NOTE ADULT - PROBLEM SELECTOR PLAN 2
Hypothyroidism-hx of thyroidectomy   on 150mcg Levothyroxine at home   TSH suppressed  free thyroxine level :1.56  repeat TSH: 0.22  continue with Levothyroxine to 125mcg daily

## 2022-04-19 NOTE — PROGRESS NOTE ADULT - PROBLEM SELECTOR PLAN 1
DM type 2  Please continue lantus      units at night.    Please continue lispro     units before each meal.    Please continue lispro moderate dose sliding scale four times daily with meals and at bedtime    Pt's fingerstick glucose goal is 100-180 DM type 2  Please increase lantus  to 40    units at night.    Please continue lispro   6  units before each meal.    Please continue lispro moderate dose sliding scale four times daily with meals and at bedtime    Pt's fingerstick glucose goal is 100-180

## 2022-04-20 ENCOUNTER — TRANSCRIPTION ENCOUNTER (OUTPATIENT)
Age: 67
End: 2022-04-20

## 2022-04-20 VITALS — TEMPERATURE: 99 F

## 2022-04-20 LAB
ANION GAP SERPL CALC-SCNC: 11 MMOL/L — SIGNIFICANT CHANGE UP (ref 5–17)
BASOPHILS # BLD AUTO: 0.02 K/UL — SIGNIFICANT CHANGE UP (ref 0–0.2)
BASOPHILS NFR BLD AUTO: 0.3 % — SIGNIFICANT CHANGE UP (ref 0–2)
BUN SERPL-MCNC: 9 MG/DL — SIGNIFICANT CHANGE UP (ref 7–23)
CALCIUM SERPL-MCNC: 8.6 MG/DL — SIGNIFICANT CHANGE UP (ref 8.4–10.5)
CHLORIDE SERPL-SCNC: 103 MMOL/L — SIGNIFICANT CHANGE UP (ref 96–108)
CO2 SERPL-SCNC: 24 MMOL/L — SIGNIFICANT CHANGE UP (ref 22–31)
CREAT SERPL-MCNC: 0.58 MG/DL — SIGNIFICANT CHANGE UP (ref 0.5–1.3)
EGFR: 100 ML/MIN/1.73M2 — SIGNIFICANT CHANGE UP
EOSINOPHIL # BLD AUTO: 0.25 K/UL — SIGNIFICANT CHANGE UP (ref 0–0.5)
EOSINOPHIL NFR BLD AUTO: 3.5 % — SIGNIFICANT CHANGE UP (ref 0–6)
GLUCOSE BLDC GLUCOMTR-MCNC: 152 MG/DL — HIGH (ref 70–99)
GLUCOSE BLDC GLUCOMTR-MCNC: 183 MG/DL — HIGH (ref 70–99)
GLUCOSE BLDC GLUCOMTR-MCNC: 195 MG/DL — HIGH (ref 70–99)
GLUCOSE SERPL-MCNC: 175 MG/DL — HIGH (ref 70–99)
HCT VFR BLD CALC: 37.2 % — SIGNIFICANT CHANGE UP (ref 34.5–45)
HGB BLD-MCNC: 12.3 G/DL — SIGNIFICANT CHANGE UP (ref 11.5–15.5)
IMM GRANULOCYTES NFR BLD AUTO: 0.4 % — SIGNIFICANT CHANGE UP (ref 0–1.5)
LYMPHOCYTES # BLD AUTO: 1.53 K/UL — SIGNIFICANT CHANGE UP (ref 1–3.3)
LYMPHOCYTES # BLD AUTO: 21.6 % — SIGNIFICANT CHANGE UP (ref 13–44)
MAGNESIUM SERPL-MCNC: 2 MG/DL — SIGNIFICANT CHANGE UP (ref 1.6–2.6)
MCHC RBC-ENTMCNC: 27.6 PG — SIGNIFICANT CHANGE UP (ref 27–34)
MCHC RBC-ENTMCNC: 33.1 GM/DL — SIGNIFICANT CHANGE UP (ref 32–36)
MCV RBC AUTO: 83.6 FL — SIGNIFICANT CHANGE UP (ref 80–100)
MONOCYTES # BLD AUTO: 0.41 K/UL — SIGNIFICANT CHANGE UP (ref 0–0.9)
MONOCYTES NFR BLD AUTO: 5.8 % — SIGNIFICANT CHANGE UP (ref 2–14)
NEUTROPHILS # BLD AUTO: 4.85 K/UL — SIGNIFICANT CHANGE UP (ref 1.8–7.4)
NEUTROPHILS NFR BLD AUTO: 68.4 % — SIGNIFICANT CHANGE UP (ref 43–77)
NRBC # BLD: 0 /100 WBCS — SIGNIFICANT CHANGE UP (ref 0–0)
PLATELET # BLD AUTO: 210 K/UL — SIGNIFICANT CHANGE UP (ref 150–400)
POTASSIUM SERPL-MCNC: 3.7 MMOL/L — SIGNIFICANT CHANGE UP (ref 3.5–5.3)
POTASSIUM SERPL-SCNC: 3.7 MMOL/L — SIGNIFICANT CHANGE UP (ref 3.5–5.3)
RBC # BLD: 4.45 M/UL — SIGNIFICANT CHANGE UP (ref 3.8–5.2)
RBC # FLD: 13 % — SIGNIFICANT CHANGE UP (ref 10.3–14.5)
SODIUM SERPL-SCNC: 138 MMOL/L — SIGNIFICANT CHANGE UP (ref 135–145)
WBC # BLD: 7.09 K/UL — SIGNIFICANT CHANGE UP (ref 3.8–10.5)
WBC # FLD AUTO: 7.09 K/UL — SIGNIFICANT CHANGE UP (ref 3.8–10.5)

## 2022-04-20 PROCEDURE — 99239 HOSP IP/OBS DSCHRG MGMT >30: CPT

## 2022-04-20 PROCEDURE — 99231 SBSQ HOSP IP/OBS SF/LOW 25: CPT

## 2022-04-20 RX ORDER — ISOSORBIDE MONONITRATE 60 MG/1
1 TABLET, EXTENDED RELEASE ORAL
Qty: 30 | Refills: 2
Start: 2022-04-20 | End: 2022-07-18

## 2022-04-20 RX ORDER — ACETAMINOPHEN 500 MG
650 TABLET ORAL ONCE
Refills: 0 | Status: COMPLETED | OUTPATIENT
Start: 2022-04-20 | End: 2022-04-20

## 2022-04-20 RX ORDER — LEVOTHYROXINE SODIUM 125 MCG
1 TABLET ORAL
Qty: 0 | Refills: 0 | DISCHARGE

## 2022-04-20 RX ORDER — POTASSIUM CHLORIDE 20 MEQ
40 PACKET (EA) ORAL ONCE
Refills: 0 | Status: COMPLETED | OUTPATIENT
Start: 2022-04-20 | End: 2022-04-20

## 2022-04-20 RX ORDER — INSULIN ASPART 100 [IU]/ML
8 INJECTION, SOLUTION SUBCUTANEOUS
Qty: 0 | Refills: 0 | DISCHARGE

## 2022-04-20 RX ORDER — INSULIN ASPART 100 [IU]/ML
8 INJECTION, SOLUTION SUBCUTANEOUS
Qty: 3 | Refills: 0
Start: 2022-04-20

## 2022-04-20 RX ORDER — INSULIN ASPART 100 [IU]/ML
8 INJECTION, SOLUTION SUBCUTANEOUS
Qty: 10 | Refills: 1
Start: 2022-04-20 | End: 2022-06-18

## 2022-04-20 RX ORDER — ASPIRIN/CALCIUM CARB/MAGNESIUM 324 MG
1 TABLET ORAL
Qty: 30 | Refills: 11
Start: 2022-04-20 | End: 2023-04-14

## 2022-04-20 RX ORDER — LEVOTHYROXINE SODIUM 125 MCG
1 TABLET ORAL
Qty: 30 | Refills: 2
Start: 2022-04-20 | End: 2022-07-18

## 2022-04-20 RX ADMIN — CLOPIDOGREL BISULFATE 75 MILLIGRAM(S): 75 TABLET, FILM COATED ORAL at 09:54

## 2022-04-20 RX ADMIN — Medication 650 MILLIGRAM(S): at 13:22

## 2022-04-20 RX ADMIN — Medication 6 UNIT(S): at 09:59

## 2022-04-20 RX ADMIN — Medication 40 MILLIEQUIVALENT(S): at 09:55

## 2022-04-20 RX ADMIN — Medication 81 MILLIGRAM(S): at 09:54

## 2022-04-20 RX ADMIN — Medication 650 MILLIGRAM(S): at 12:22

## 2022-04-20 RX ADMIN — Medication 40 MILLIGRAM(S): at 09:55

## 2022-04-20 RX ADMIN — Medication 2: at 12:22

## 2022-04-20 RX ADMIN — PANTOPRAZOLE SODIUM 40 MILLIGRAM(S): 20 TABLET, DELAYED RELEASE ORAL at 06:25

## 2022-04-20 RX ADMIN — Medication 125 MICROGRAM(S): at 06:25

## 2022-04-20 RX ADMIN — Medication 2: at 08:40

## 2022-04-20 NOTE — PROGRESS NOTE ADULT - SUBJECTIVE AND OBJECTIVE BOX
INTERVAL HPI/OVERNIGHT EVENTS:    Patient is a 67y old  Female who presents with a chief complaint of CHEST PAIN  cardiac cath yesterday with 1 stent to dLCx. complaining of right sided headache    FSG & insulin:  Yesterday:  Dinner FSG 94. No lispro. Late dinner, salmon, potatoes, and fruit.  Bedtime . Lantus 40 + lispro 4.  Today:  Breakfast /152. Lispro 2. Didn't want to eat early.  Had 1/2 Irish toast and some home fries around 10 with lispro 6.  Lunch . Lispro 2.      Pt reports the following symptoms:    CONSTITUTIONAL:  Negative fever or chills, feels well, good appetite  EYES:  Negative  blurry vision or double vision  CARDIOVASCULAR:  Negative for chest pain or palpitations  RESPIRATORY:  Negative for cough, wheezing, or SOB   GASTROINTESTINAL:  Negative for nausea, vomiting, diarrhea, constipation, or abdominal pain  GENITOURINARY:  Negative frequency, urgency or dysuria  NEUROLOGIC:  No headache, confusion, dizziness, lightheadedness        Pt reports the following symptoms:    CONSTITUTIONAL:  Negative fever or chills, feels well, good appetite  EYES:  Negative  blurry vision or double vision  CARDIOVASCULAR:  Negative for chest pain or palpitations  RESPIRATORY:  Negative for cough, wheezing, or SOB   GASTROINTESTINAL:  Negative for nausea, vomiting, diarrhea, constipation, or abdominal pain  GENITOURINARY:  Negative frequency, urgency or dysuria  NEUROLOGIC:  No headache, confusion, dizziness, lightheadedness    MEDICATIONS  (STANDING):  aspirin enteric coated 81 milliGRAM(s) Oral daily  atorvastatin 80 milliGRAM(s) Oral at bedtime  chlorhexidine 4% Liquid 1 Application(s) Topical once  dextrose 5%. 1000 milliLiter(s) (50 mL/Hr) IV Continuous <Continuous>  dextrose 50% Injectable 25 Gram(s) IV Push once  glucagon  Injectable 1 milliGRAM(s) IntraMuscular once  insulin glargine Injectable (LANTUS) 37 Unit(s) SubCutaneous at bedtime  insulin lispro (ADMELOG) corrective regimen sliding scale   SubCutaneous Before meals and at bedtime  insulin lispro Injectable (ADMELOG) 6 Unit(s) SubCutaneous three times a day before meals  isosorbide   mononitrate ER Tablet (IMDUR) 30 milliGRAM(s) Oral daily  levothyroxine 125 MICROGram(s) Oral daily  pantoprazole    Tablet 40 milliGRAM(s) Oral before breakfast  PARoxetine 40 milliGRAM(s) Oral daily  potassium chloride    Tablet ER 20 milliEquivalent(s) Oral once  sodium chloride 0.9%. 1000 milliLiter(s) (75 mL/Hr) IV Continuous <Continuous>    MEDICATIONS  (PRN):  dextrose Oral Gel 15 Gram(s) Oral once PRN Blood Glucose LESS THAN 70 milliGRAM(s)/deciliter      PHYSICAL EXAM  Vital Signs Last 24 Hrs  T(C): 36.3 (19 Apr 2022 10:35), Max: 37.1 (18 Apr 2022 21:56)  T(F): 97.3 (19 Apr 2022 10:35), Max: 98.7 (18 Apr 2022 21:56)  HR: 61 (19 Apr 2022 12:00) (61 - 71)  BP: 110/57 (19 Apr 2022 12:00) (97/54 - 126/60)  BP(mean): 78 (19 Apr 2022 12:00) (72 - 78)  RR: 17 (19 Apr 2022 12:00) (16 - 17)  SpO2: 97% (19 Apr 2022 12:00) (95% - 98%)    Constitutional: wn/wd in NAD.   HEENT: NCAT, MMM, OP clear, EOMI, no proptosis or lid retraction  Neck: no thyromegaly or palpable thyroid nodules   Respiratory: lungs CTAB.  Cardiovascular: regular rhythm, normal S1 and S2, no audible murmurs, no peripheral edema  GI: soft, NT/ND, no masses/HSM appreciated.  Neurology: no tremors, DTR 2+  Skin: no visible rashes/lesions.  Psychiatric: AAO x 3, normal affect/mood.    LABS:                        12.1   5.92  )-----------( 204      ( 19 Apr 2022 09:25 )             36.6     04-19    141  |  106  |  9   ----------------------------<  154<H>  3.4<L>   |  24  |  0.59    Ca    8.6      19 Apr 2022 09:25  Mg     2.1     04-19      PT/INR - ( 19 Apr 2022 09:25 )   PT: 12.1 sec;   INR: 1.02          PTT - ( 19 Apr 2022 09:25 )  PTT:34.6 sec    Thyroid Stimulating Hormone, Serum: 0.221 uIU/mL (04-17 @ 06:41)  Thyroid Stimulating Hormone, Serum: <0.118 uIU/mL (04-15 @ 19:05)      HbA1C:   CAPILLARY BLOOD GLUCOSE      POCT Blood Glucose.: 109 mg/dL (19 Apr 2022 11:19)  POCT Blood Glucose.: 171 mg/dL (19 Apr 2022 06:38)  POCT Blood Glucose.: 166 mg/dL (18 Apr 2022 21:39)  POCT Blood Glucose.: 286 mg/dL (18 Apr 2022 17:27)

## 2022-04-20 NOTE — DISCHARGE NOTE NURSING/CASE MANAGEMENT/SOCIAL WORK - PATIENT PORTAL LINK FT
You can access the FollowMyHealth Patient Portal offered by [facility] by registering at the following website: [email or web address]. By joining [employer]’s FollowMyHealth portal, you will also be able to view your health information using other applications (apps) compatible with our system.

## 2022-04-20 NOTE — PROGRESS NOTE ADULT - PROBLEM SELECTOR PLAN 2
Hypothyroidism-hx of thyroidectomy   on 150mcg Levothyroxine at home   TSH suppressed  free thyroxine level: 1.56  repeat TSH: 0.22  continue with Levothyroxine  125mcg daily  Repeat TFTS in 8 weeks.

## 2022-04-20 NOTE — PROGRESS NOTE ADULT - PROBLEM SELECTOR PLAN 1
DM type 2  Please increase lantus  to 45    units at night.    Please continue lispro   8  units before each meal.    Please continue lispro moderate dose sliding scale four times daily with meals and at bedtime    Pt's fingerstick glucose goal is 100-180    For discharge, pt can continue Lantus 45 units HS and novolog 8-10 units before meals. hospital doses are very close to home doses, so should work with good die

## 2022-04-20 NOTE — PROGRESS NOTE ADULT - ASSESSMENT
68 yo F with PMH DM, HTN, HLD, depression who presents with CC of chest pain and back pain. Admitted for further workup and monitoring to cardiac telemetry given extensive family hx and other risk factors. CCTA 4/18/22.  A1c: 10.4%  Wt:70kg  Cr:  GFR:  TSH: <0.118    Will continue to monitor       Pt can follow up at discharge with [facility] by calling  to make an appointment.   Will discuss case with Dr. Martínez and update primary tea

## 2022-04-20 NOTE — DISCHARGE NOTE NURSING/CASE MANAGEMENT/SOCIAL WORK - NSDCPEFALRISK_GEN_ALL_CORE
For information on Fall & Injury Prevention, visit: [email or web address] OR  [email or web address] OR  https://www.cdc.gov/steadi/patient.html

## 2022-04-20 NOTE — PROGRESS NOTE ADULT - PROVIDER SPECIALTY LIST ADULT
Endocrinology
Intervent Cardiology
Cardiology
Intervent Cardiology
Intervent Cardiology

## 2022-04-21 RX ORDER — ASPIRIN/CALCIUM CARB/MAGNESIUM 324 MG
1 TABLET ORAL
Qty: 30 | Refills: 11
Start: 2022-04-21 | End: 2023-04-15

## 2022-04-21 RX ORDER — CLOPIDOGREL BISULFATE 75 MG/1
1 TABLET, FILM COATED ORAL
Qty: 30 | Refills: 11
Start: 2022-04-21 | End: 2023-04-15

## 2022-04-25 DIAGNOSIS — Z90.710 ACQUIRED ABSENCE OF BOTH CERVIX AND UTERUS: ICD-10-CM

## 2022-04-25 DIAGNOSIS — Z85.850 PERSONAL HISTORY OF MALIGNANT NEOPLASM OF THYROID: ICD-10-CM

## 2022-04-25 DIAGNOSIS — K21.9 GASTRO-ESOPHAGEAL REFLUX DISEASE WITHOUT ESOPHAGITIS: ICD-10-CM

## 2022-04-25 DIAGNOSIS — R42 DIZZINESS AND GIDDINESS: ICD-10-CM

## 2022-04-25 DIAGNOSIS — Z79.82 LONG TERM (CURRENT) USE OF ASPIRIN: ICD-10-CM

## 2022-04-25 DIAGNOSIS — Z79.4 LONG TERM (CURRENT) USE OF INSULIN: ICD-10-CM

## 2022-04-25 DIAGNOSIS — R07.9 CHEST PAIN, UNSPECIFIED: ICD-10-CM

## 2022-04-25 DIAGNOSIS — Z87.891 PERSONAL HISTORY OF NICOTINE DEPENDENCE: ICD-10-CM

## 2022-04-25 DIAGNOSIS — E78.5 HYPERLIPIDEMIA, UNSPECIFIED: ICD-10-CM

## 2022-04-25 DIAGNOSIS — R91.1 SOLITARY PULMONARY NODULE: ICD-10-CM

## 2022-04-25 DIAGNOSIS — Z88.8 ALLERGY STATUS TO OTHER DRUGS, MEDICAMENTS AND BIOLOGICAL SUBSTANCES: ICD-10-CM

## 2022-04-25 DIAGNOSIS — E11.9 TYPE 2 DIABETES MELLITUS WITHOUT COMPLICATIONS: ICD-10-CM

## 2022-04-25 DIAGNOSIS — I10 ESSENTIAL (PRIMARY) HYPERTENSION: ICD-10-CM

## 2022-04-25 DIAGNOSIS — Z82.49 FAMILY HISTORY OF ISCHEMIC HEART DISEASE AND OTHER DISEASES OF THE CIRCULATORY SYSTEM: ICD-10-CM

## 2022-04-25 DIAGNOSIS — I25.110 ATHEROSCLEROTIC HEART DISEASE OF NATIVE CORONARY ARTERY WITH UNSTABLE ANGINA PECTORIS: ICD-10-CM

## 2022-04-25 DIAGNOSIS — F41.9 ANXIETY DISORDER, UNSPECIFIED: ICD-10-CM

## 2022-04-25 DIAGNOSIS — E89.0 POSTPROCEDURAL HYPOTHYROIDISM: ICD-10-CM

## 2022-04-25 PROBLEM — Z00.00 ENCOUNTER FOR PREVENTIVE HEALTH EXAMINATION: Status: ACTIVE | Noted: 2022-04-25

## 2022-04-26 PROCEDURE — 87635 SARS-COV-2 COVID-19 AMP PRB: CPT

## 2022-04-26 PROCEDURE — C1887: CPT

## 2022-04-26 PROCEDURE — 83735 ASSAY OF MAGNESIUM: CPT

## 2022-04-26 PROCEDURE — 84484 ASSAY OF TROPONIN QUANT: CPT

## 2022-04-26 PROCEDURE — 83036 HEMOGLOBIN GLYCOSYLATED A1C: CPT

## 2022-04-26 PROCEDURE — C1724: CPT

## 2022-04-26 PROCEDURE — 85027 COMPLETE CBC AUTOMATED: CPT

## 2022-04-26 PROCEDURE — C1874: CPT

## 2022-04-26 PROCEDURE — 85730 THROMBOPLASTIN TIME PARTIAL: CPT

## 2022-04-26 PROCEDURE — 84436 ASSAY OF TOTAL THYROXINE: CPT

## 2022-04-26 PROCEDURE — 36415 COLL VENOUS BLD VENIPUNCTURE: CPT

## 2022-04-26 PROCEDURE — 80048 BASIC METABOLIC PNL TOTAL CA: CPT

## 2022-04-26 PROCEDURE — 93306 TTE W/DOPPLER COMPLETE: CPT

## 2022-04-26 PROCEDURE — C1760: CPT

## 2022-04-26 PROCEDURE — 82962 GLUCOSE BLOOD TEST: CPT

## 2022-04-26 PROCEDURE — 99285 EMERGENCY DEPT VISIT HI MDM: CPT

## 2022-04-26 PROCEDURE — 71275 CT ANGIOGRAPHY CHEST: CPT | Mod: MG

## 2022-04-26 PROCEDURE — C1753: CPT

## 2022-04-26 PROCEDURE — 85652 RBC SED RATE AUTOMATED: CPT

## 2022-04-26 PROCEDURE — 86900 BLOOD TYPING SEROLOGIC ABO: CPT

## 2022-04-26 PROCEDURE — C1769: CPT

## 2022-04-26 PROCEDURE — 85610 PROTHROMBIN TIME: CPT

## 2022-04-26 PROCEDURE — 86140 C-REACTIVE PROTEIN: CPT

## 2022-04-26 PROCEDURE — 86901 BLOOD TYPING SEROLOGIC RH(D): CPT

## 2022-04-26 PROCEDURE — 86850 RBC ANTIBODY SCREEN: CPT

## 2022-04-26 PROCEDURE — 82553 CREATINE MB FRACTION: CPT

## 2022-04-26 PROCEDURE — 81003 URINALYSIS AUTO W/O SCOPE: CPT

## 2022-04-26 PROCEDURE — 93005 ELECTROCARDIOGRAM TRACING: CPT

## 2022-04-26 PROCEDURE — 82550 ASSAY OF CK (CPK): CPT

## 2022-04-26 PROCEDURE — G1004: CPT

## 2022-04-26 PROCEDURE — 75574 CT ANGIO HRT W/3D IMAGE: CPT

## 2022-04-26 PROCEDURE — 83690 ASSAY OF LIPASE: CPT

## 2022-04-26 PROCEDURE — 70450 CT HEAD/BRAIN W/O DYE: CPT | Mod: MG

## 2022-04-26 PROCEDURE — 96374 THER/PROPH/DIAG INJ IV PUSH: CPT

## 2022-04-26 PROCEDURE — C1894: CPT

## 2022-04-26 PROCEDURE — 80061 LIPID PANEL: CPT

## 2022-04-26 PROCEDURE — 74174 CTA ABD&PLVS W/CONTRAST: CPT | Mod: MG

## 2022-04-26 PROCEDURE — 84439 ASSAY OF FREE THYROXINE: CPT

## 2022-04-26 PROCEDURE — C1725: CPT

## 2022-04-26 PROCEDURE — 84443 ASSAY THYROID STIM HORMONE: CPT

## 2022-04-26 PROCEDURE — 86803 HEPATITIS C AB TEST: CPT

## 2022-04-26 PROCEDURE — 85025 COMPLETE CBC W/AUTO DIFF WBC: CPT

## 2022-04-26 PROCEDURE — 80053 COMPREHEN METABOLIC PANEL: CPT

## 2022-04-28 ENCOUNTER — APPOINTMENT (OUTPATIENT)
Dept: HEART AND VASCULAR | Facility: CLINIC | Age: 67
End: 2022-04-28
Payer: MEDICARE

## 2022-04-28 ENCOUNTER — NON-APPOINTMENT (OUTPATIENT)
Age: 67
End: 2022-04-28

## 2022-04-28 VITALS
OXYGEN SATURATION: 96 % | HEART RATE: 63 BPM | BODY MASS INDEX: 29.44 KG/M2 | HEIGHT: 62 IN | SYSTOLIC BLOOD PRESSURE: 147 MMHG | DIASTOLIC BLOOD PRESSURE: 76 MMHG | WEIGHT: 159.99 LBS

## 2022-04-28 DIAGNOSIS — I25.10 ATHEROSCLEROTIC HEART DISEASE OF NATIVE CORONARY ARTERY W/OUT ANGINA PECTORIS: ICD-10-CM

## 2022-04-28 DIAGNOSIS — I10 ESSENTIAL (PRIMARY) HYPERTENSION: ICD-10-CM

## 2022-04-28 DIAGNOSIS — K21.9 GASTRO-ESOPHAGEAL REFLUX DISEASE W/OUT ESOPHAGITIS: ICD-10-CM

## 2022-04-28 PROCEDURE — 99214 OFFICE O/P EST MOD 30 MIN: CPT

## 2022-04-28 PROCEDURE — 93000 ELECTROCARDIOGRAM COMPLETE: CPT

## 2022-04-28 RX ORDER — ROSUVASTATIN CALCIUM 40 MG/1
40 TABLET, FILM COATED ORAL DAILY
Qty: 90 | Refills: 3 | Status: ACTIVE | COMMUNITY
Start: 2022-04-28

## 2022-04-28 RX ORDER — ASPIRIN 81 MG/1
81 TABLET, DELAYED RELEASE ORAL
Qty: 90 | Refills: 3 | Status: ACTIVE | COMMUNITY
Start: 2022-04-28

## 2022-04-28 RX ORDER — CLOPIDOGREL BISULFATE 75 MG/1
75 TABLET, FILM COATED ORAL DAILY
Qty: 90 | Refills: 3 | Status: ACTIVE | COMMUNITY
Start: 2022-04-28

## 2022-04-28 RX ORDER — PANTOPRAZOLE 40 MG/1
40 TABLET, DELAYED RELEASE ORAL
Qty: 90 | Refills: 3 | Status: ACTIVE | COMMUNITY
Start: 2022-04-28 | End: 1900-01-01

## 2022-04-28 RX ORDER — AMLODIPINE BESYLATE 10 MG/1
10 TABLET ORAL DAILY
Qty: 90 | Refills: 3 | Status: ACTIVE | COMMUNITY

## 2022-04-28 RX ORDER — LISINOPRIL 40 MG/1
40 TABLET ORAL DAILY
Refills: 0 | Status: ACTIVE | COMMUNITY

## 2022-04-28 RX ORDER — LEVOTHYROXINE SODIUM 0.12 MG/1
125 TABLET ORAL
Qty: 90 | Refills: 3 | Status: ACTIVE | COMMUNITY

## 2022-04-28 RX ORDER — METOPROLOL TARTRATE 25 MG/1
25 TABLET, FILM COATED ORAL DAILY
Refills: 0 | Status: ACTIVE | COMMUNITY

## 2022-04-28 RX ORDER — OMEPRAZOLE 20 MG/1
20 CAPSULE, DELAYED RELEASE ORAL DAILY
Refills: 0 | Status: DISCONTINUED | COMMUNITY
End: 2022-04-28

## 2022-04-28 NOTE — REVIEW OF SYSTEMS
[Negative] : Heme/Lymph [Depression] : depression [Anxiety] : anxiety [de-identified] : Son   last year,  in sleep

## 2022-04-28 NOTE — REASON FOR VISIT
[FreeTextEntry1] : 66 y/o F with HTN, HLD and T2d adm to [facility] 4/15 to 22 with CP.  ED course: VSS except tachycardia, troponin (-), other labs wnl, CT chest/abdom- No aortic dissection or aneurysm. No acute abnormality, CT head \par (-), ekg NSR. Pt was admitted to [address] [place] [address] overnight for observation. \par \par ECHO 2022: LVEF 70%, mild sLVH, no significant valvular disease. CCTA \par 22: calcium scores 678, severe ostial stenosis of the first diagonal \par branch. Prox RCA has mild to moderate stenosis, <25% stenosis of the LM. Pt \par underwent cardiac cath 2022 DOMO x1 dLCx; LM no significant dz, mLAD 20%, \par ostial D1 90% (small), RCA w/ LI, pRCA 20%; LVEF 65%, LVEDP 8mmHg. Access site R \par groin PC. No hematoma or bleeding.\par \par No cardiac c/o.  Son  last year, chronic depression and anxiety.

## 2022-04-28 NOTE — ASSESSMENT
[FreeTextEntry1] : ASHD- s/p dLCx stent, CCS= 678.  Pt made aware.\par \par HTN- BP good\par \par HLD- on high dose statin\par \par DM- has apt to see [facility]

## 2022-05-05 ENCOUNTER — APPOINTMENT (OUTPATIENT)
Dept: ENDOCRINOLOGY | Facility: CLINIC | Age: 67
End: 2022-05-05
Payer: MEDICARE

## 2022-05-05 ENCOUNTER — RESULT REVIEW (OUTPATIENT)
Age: 67
End: 2022-05-05

## 2022-05-05 VITALS
SYSTOLIC BLOOD PRESSURE: 150 MMHG | HEART RATE: 91 BPM | BODY MASS INDEX: 28.72 KG/M2 | WEIGHT: 157 LBS | DIASTOLIC BLOOD PRESSURE: 80 MMHG

## 2022-05-05 DIAGNOSIS — C73 MALIGNANT NEOPLASM OF THYROID GLAND: ICD-10-CM

## 2022-05-05 PROCEDURE — 82962 GLUCOSE BLOOD TEST: CPT

## 2022-05-05 PROCEDURE — 99205 OFFICE O/P NEW HI 60 MIN: CPT | Mod: 25

## 2022-05-05 RX ORDER — DAPAGLIFLOZIN 5 MG/1
5 TABLET, FILM COATED ORAL
Qty: 90 | Refills: 2 | Status: ACTIVE | COMMUNITY
Start: 2022-05-05 | End: 1900-01-01

## 2022-05-05 RX ORDER — FLASH GLUCOSE SENSOR
KIT MISCELLANEOUS
Qty: 3 | Refills: 2 | Status: ACTIVE | COMMUNITY
Start: 2022-05-05 | End: 1900-01-01

## 2022-05-05 RX ORDER — FLASH GLUCOSE SCANNING READER
EACH MISCELLANEOUS
Qty: 1 | Refills: 0 | Status: ACTIVE | COMMUNITY
Start: 2022-05-05 | End: 1900-01-01

## 2022-05-05 NOTE — REASON FOR VISIT
[Initial Evaluation] : an initial evaluation [DM Type 2] : DM Type 2 [Hypothyroidism] : hypothyroidism

## 2022-05-06 NOTE — END OF VISIT
[Time Spent: ___ minutes] : I have spent [unfilled] minutes of time on the encounter. [FreeTextEntry3] : All medical record entries made by the Scribe were at my, Dr. Bear Agrawal, direction and personally dictated by me on 05/05/2022. I have reviewed the chart and agree that the record accurately reflects my personal performance of the history, physical exam, assessment and plan. I have also personally directed, reviewed and agreed with the chart.

## 2022-05-06 NOTE — HISTORY OF PRESENT ILLNESS
[FreeTextEntry1] : 67 year y/o F pt, with Hx of T2DM diagnosed 10 yrs ago, referred by [facility], presents today to establish endocrine care with me.  \par DM related complications: peripheral neuropathy, retinopathy, and CAD (received cardiac stent in 2022). She also has hx of Hypothyroidism (received 2 surgeries for Thyroid CA). No SANDY treatment. \par Last Funduscopic visit: \par Other PMHx: chronic back pain, HTN, HLD, Depression. \par PSHx: Hemithyroidectomy ("12 yrs ago" at [facility]), Completion thyroidectomy (few months later "12 yrs ago" for thyroid CA as per pt; no radiation. Lost care because pt moved to [place])\par Sister: CAD (stents). Brother: CAD (on defibrillator and peace maker). Son  of MI age 45. Daughter: MI at age 40. \par Social Hx: Former smoker: quit 6 yrs ago. No EtOH use. Lives with her daughter.   \par Last Endocrinologist visit: 5-6 months ago in [place]. \par Last cardiologist visit: 2022\par Recent Hospitalization in 2-3 years: Yes, 22 and 22. \par Allergic to: Neurontin\par \par 2022\par Patient presents today, referred by [facility] after her hospitalization on 22 for acute exacerbation of her chronic back pain and for CP. She also underwent cardiac stent during this visit.\par Pt was diagnosed with DM 10 yrs ago. She was initially prescribed Metformin, then transitioned to insulin soon after: Lantus 15 u and Novolog up to 10 u ac. Prior to her ER visit, her BS ran in the 300s (from discharge note). \par \par Pt also notes that she was diagnosed with hypothyroidism and she underwent 2 thyroid surgeries at [facility] for thyroid CA ~ 12 yrs ago (refer to 'PSHx' for details). Last endocrinologist follow up was ~ 5-6 months ago in [place]. \par On 22, pt was hospitalized again for positional vertigo. Blood work was done as per pt (reports unavailable). \par  \par Patient presents today with POCT 206, /80 and BMI 28.72, with c/o insomnia. Sugars at home continue to be high in the 200s as per pt. At present, pt takes Novolog 12 u ac and basal insulin 52 u for DM. Levothyroxine was decreased to 125 mcg 3 weeks ago after receiving cardiac stent during her ER visit in 2022.\par She saw cardiologist last week and ophthalmologist last year. \par \par Current Medications: Novolog 12 u ac, basal insulin 52 u, Farxiga 5 mg (initiated 22), Atorvastatin 40 mg, Levothyroxine 125 mcg, Amlodipine 10 mg, ASA 81 mg, Lisinopril 40 mg, Metoprolol 25 mg, Paroxetine 40 mg, Plavix. \par \par Laboratory: \par - 22 (from discharge note): A1c 10.4% (H), s.creat 0.57, ca 9.2, ALT 36, TSH <0.1  [SANDY] : patient was not treated with radioactive iodine [de-identified] : "12 yrs ago" [de-identified] : Hemithyroidectomy [de-identified] : "12 yrs ago"

## 2022-05-06 NOTE — THERAPY
[Today's Date] : [unfilled] [Novolog] : Novolog [FreeTextEntry9] : 52 u [de-identified] : 12 u ac [FreeTextEntry7] : Atorvastatin 40 mg, Farxiga 5 mg.

## 2022-05-06 NOTE — ASSESSMENT
[Levothyroxine] : The patient was instructed to take Levothyroxine on an empty stomach, separate from vitamins, and wait at least 30 minutes before eating [Carbohydrate Consistent Diet] : carbohydrate consistent diet [Importance of Diet and Exercise] : importance of diet and exercise to improve glycemic control, achieve weight loss and improve cardiovascular health [Self Monitoring of Blood Glucose] : self monitoring of blood glucose [FreeTextEntry1] : 67 year y/o F pt with:\par \par 1.T2DM diagnosed ~10 yrs ago:\par Patient has peripheral neuropathy, retinopathy, and CAD (in 04/2022, she received cardiac stent). \par Her DM has been poorly controlled despite being on MDI. \par Diabetes treatment goals including target goals for BP, LDL-c, A1c, and body weight were discussed. \par Explained the importance of glucose monitoring, along with targets for pre and post prandial BS. \par Instructed the pt on anti-diabetic medications, including benefits and side effects, and insulin kinetics. \par Recommend pt continue present DM management, until the assessment of current evaluation is completed. \par Refer pt to see RD and nurse educator for comprehensive diabetes teachings. \par Will order labs today, including metabolic and lipid profiles.\par Start Farxiga 5 mg. Benefits and side effects were explained to the pt. \par \par 2. History of Total Thyroidectomy ~ 12 yrs ago at [facility]:\par Last endocrinologist follow up was in [place] 5-6 months ago. \par Denies upper respiratory obstruction and voice changes. \par Obtain previous medical records. \par Overview on Thyroid CA was provided to pt. \par Order labs and Thyroid US.\par \par Return in: 3-4 weeks.

## 2022-05-06 NOTE — ADDENDUM
[FreeTextEntry1] : I Marycruz Jorge act soley as a scribe for Dr. Bear Agrawal on this date. 05/05/2022

## 2022-05-06 NOTE — PHYSICAL EXAM
[Alert] : alert [Normal Sclera/Conjunctiva] : normal sclera/conjunctiva [Normal Outer Ear/Nose] : the ears and nose were normal in appearance [No Neck Mass] : no neck mass was observed [Clear to Auscultation] : lungs were clear to auscultation bilaterally [Normal Rate] : heart rate was normal [Regular Rhythm] : with a regular rhythm [No Edema] : no peripheral edema [Spine Straight] : spine straight [No Stigmata of Cushings Syndrome] : no stigmata of Cushings Syndrome [Normal Gait] : normal gait [Right foot was examined, including] : right foot ~C was examined, including visual inspection with sensory and pulse exams [Left foot was examined, including] : left foot ~C was examined, including visual inspection with sensory and pulse exams [2+] : 2+ in the dorsalis pedis [Normal Reflexes] : deep tendon reflexes were 2+ and symmetric [No Tremors] : no tremors [Oriented x3] : oriented to person, place, and time [Acanthosis Nigricans] : no acanthosis nigricans [Vibration Dec.] : normal vibratory sensation at the level of the toes [de-identified] : periorbital edema.

## 2022-05-09 ENCOUNTER — EMERGENCY (EMERGENCY)
Facility: HOSPITAL | Age: 67
LOS: 1 days | Discharge: HOME | End: 2022-05-09
Attending: EMERGENCY MEDICINE | Admitting: EMERGENCY MEDICINE
Payer: MEDICARE

## 2022-05-09 VITALS
OXYGEN SATURATION: 96 % | SYSTOLIC BLOOD PRESSURE: 123 MMHG | DIASTOLIC BLOOD PRESSURE: 76 MMHG | RESPIRATION RATE: 18 BRPM | TEMPERATURE: 100 F | HEART RATE: 111 BPM | HEIGHT: 62 IN | WEIGHT: 160.06 LBS

## 2022-05-09 VITALS
TEMPERATURE: 100 F | HEART RATE: 104 BPM | RESPIRATION RATE: 18 BRPM | DIASTOLIC BLOOD PRESSURE: 89 MMHG | SYSTOLIC BLOOD PRESSURE: 112 MMHG | OXYGEN SATURATION: 94 %

## 2022-05-09 DIAGNOSIS — Z20.822 CONTACT WITH AND (SUSPECTED) EXPOSURE TO COVID-19: ICD-10-CM

## 2022-05-09 DIAGNOSIS — Z79.4 LONG TERM (CURRENT) USE OF INSULIN: ICD-10-CM

## 2022-05-09 DIAGNOSIS — G43.909 MIGRAINE, UNSPECIFIED, NOT INTRACTABLE, WITHOUT STATUS MIGRAINOSUS: ICD-10-CM

## 2022-05-09 DIAGNOSIS — F41.9 ANXIETY DISORDER, UNSPECIFIED: ICD-10-CM

## 2022-05-09 DIAGNOSIS — Z90.49 ACQUIRED ABSENCE OF OTHER SPECIFIED PARTS OF DIGESTIVE TRACT: Chronic | ICD-10-CM

## 2022-05-09 DIAGNOSIS — U07.1 COVID-19: ICD-10-CM

## 2022-05-09 DIAGNOSIS — H92.03 OTALGIA, BILATERAL: ICD-10-CM

## 2022-05-09 DIAGNOSIS — E89.0 POSTPROCEDURAL HYPOTHYROIDISM: Chronic | ICD-10-CM

## 2022-05-09 DIAGNOSIS — R19.7 DIARRHEA, UNSPECIFIED: ICD-10-CM

## 2022-05-09 DIAGNOSIS — R50.9 FEVER, UNSPECIFIED: ICD-10-CM

## 2022-05-09 DIAGNOSIS — Z85.850 PERSONAL HISTORY OF MALIGNANT NEOPLASM OF THYROID: ICD-10-CM

## 2022-05-09 DIAGNOSIS — K21.9 GASTRO-ESOPHAGEAL REFLUX DISEASE WITHOUT ESOPHAGITIS: ICD-10-CM

## 2022-05-09 DIAGNOSIS — Z88.8 ALLERGY STATUS TO OTHER DRUGS, MEDICAMENTS AND BIOLOGICAL SUBSTANCES STATUS: ICD-10-CM

## 2022-05-09 DIAGNOSIS — Z79.82 LONG TERM (CURRENT) USE OF ASPIRIN: ICD-10-CM

## 2022-05-09 DIAGNOSIS — Z90.89 ACQUIRED ABSENCE OF OTHER ORGANS: ICD-10-CM

## 2022-05-09 DIAGNOSIS — Z90.49 ACQUIRED ABSENCE OF OTHER SPECIFIED PARTS OF DIGESTIVE TRACT: ICD-10-CM

## 2022-05-09 DIAGNOSIS — E78.5 HYPERLIPIDEMIA, UNSPECIFIED: ICD-10-CM

## 2022-05-09 DIAGNOSIS — Z95.5 PRESENCE OF CORONARY ANGIOPLASTY IMPLANT AND GRAFT: ICD-10-CM

## 2022-05-09 DIAGNOSIS — R51.9 HEADACHE, UNSPECIFIED: ICD-10-CM

## 2022-05-09 DIAGNOSIS — Z90.710 ACQUIRED ABSENCE OF BOTH CERVIX AND UTERUS: Chronic | ICD-10-CM

## 2022-05-09 DIAGNOSIS — Z90.710 ACQUIRED ABSENCE OF BOTH CERVIX AND UTERUS: ICD-10-CM

## 2022-05-09 DIAGNOSIS — Z79.02 LONG TERM (CURRENT) USE OF ANTITHROMBOTICS/ANTIPLATELETS: ICD-10-CM

## 2022-05-09 DIAGNOSIS — E11.9 TYPE 2 DIABETES MELLITUS WITHOUT COMPLICATIONS: ICD-10-CM

## 2022-05-09 DIAGNOSIS — I25.10 ATHEROSCLEROTIC HEART DISEASE OF NATIVE CORONARY ARTERY WITHOUT ANGINA PECTORIS: ICD-10-CM

## 2022-05-09 DIAGNOSIS — E03.9 HYPOTHYROIDISM, UNSPECIFIED: ICD-10-CM

## 2022-05-09 DIAGNOSIS — N39.0 URINARY TRACT INFECTION, SITE NOT SPECIFIED: ICD-10-CM

## 2022-05-09 DIAGNOSIS — I10 ESSENTIAL (PRIMARY) HYPERTENSION: ICD-10-CM

## 2022-05-09 LAB
ALBUMIN SERPL ELPH-MCNC: 4.3 G/DL — SIGNIFICANT CHANGE UP (ref 3.3–5)
ALP SERPL-CCNC: 51 U/L — SIGNIFICANT CHANGE UP (ref 40–120)
ALT FLD-CCNC: 40 U/L — SIGNIFICANT CHANGE UP (ref 10–45)
ANION GAP SERPL CALC-SCNC: 14 MMOL/L — SIGNIFICANT CHANGE UP (ref 5–17)
APPEARANCE UR: CLEAR — SIGNIFICANT CHANGE UP
AST SERPL-CCNC: 31 U/L — SIGNIFICANT CHANGE UP (ref 10–40)
BACTERIA # UR AUTO: PRESENT /HPF
BASOPHILS # BLD AUTO: 0.02 K/UL — SIGNIFICANT CHANGE UP (ref 0–0.2)
BASOPHILS NFR BLD AUTO: 0.3 % — SIGNIFICANT CHANGE UP (ref 0–2)
BILIRUB SERPL-MCNC: 0.6 MG/DL — SIGNIFICANT CHANGE UP (ref 0.2–1.2)
BILIRUB UR-MCNC: ABNORMAL
BUN SERPL-MCNC: 9 MG/DL — SIGNIFICANT CHANGE UP (ref 7–23)
CALCIUM SERPL-MCNC: 9 MG/DL — SIGNIFICANT CHANGE UP (ref 8.4–10.5)
CHLORIDE SERPL-SCNC: 96 MMOL/L — SIGNIFICANT CHANGE UP (ref 96–108)
CO2 SERPL-SCNC: 22 MMOL/L — SIGNIFICANT CHANGE UP (ref 22–31)
COLOR SPEC: YELLOW — SIGNIFICANT CHANGE UP
COMMENT - URINE: SIGNIFICANT CHANGE UP
CREAT SERPL-MCNC: 0.82 MG/DL — SIGNIFICANT CHANGE UP (ref 0.5–1.3)
DIFF PNL FLD: NEGATIVE — SIGNIFICANT CHANGE UP
EGFR: 79 ML/MIN/1.73M2 — SIGNIFICANT CHANGE UP
EOSINOPHIL # BLD AUTO: 0.09 K/UL — SIGNIFICANT CHANGE UP (ref 0–0.5)
EOSINOPHIL NFR BLD AUTO: 1.2 % — SIGNIFICANT CHANGE UP (ref 0–6)
EPI CELLS # UR: ABNORMAL /HPF (ref 0–5)
GLUCOSE SERPL-MCNC: 326 MG/DL — HIGH (ref 70–99)
GLUCOSE UR QL: 250
HCT VFR BLD CALC: 43 % — SIGNIFICANT CHANGE UP (ref 34.5–45)
HGB BLD-MCNC: 14.3 G/DL — SIGNIFICANT CHANGE UP (ref 11.5–15.5)
HYALINE CASTS # UR AUTO: ABNORMAL /LPF (ref 0–2)
IMM GRANULOCYTES NFR BLD AUTO: 0.3 % — SIGNIFICANT CHANGE UP (ref 0–1.5)
KETONES UR-MCNC: 40 MG/DL
LEUKOCYTE ESTERASE UR-ACNC: ABNORMAL
LYMPHOCYTES # BLD AUTO: 1.1 K/UL — SIGNIFICANT CHANGE UP (ref 1–3.3)
LYMPHOCYTES # BLD AUTO: 14.7 % — SIGNIFICANT CHANGE UP (ref 13–44)
MCHC RBC-ENTMCNC: 28.7 PG — SIGNIFICANT CHANGE UP (ref 27–34)
MCHC RBC-ENTMCNC: 33.3 GM/DL — SIGNIFICANT CHANGE UP (ref 32–36)
MCV RBC AUTO: 86.3 FL — SIGNIFICANT CHANGE UP (ref 80–100)
MONOCYTES # BLD AUTO: 0.51 K/UL — SIGNIFICANT CHANGE UP (ref 0–0.9)
MONOCYTES NFR BLD AUTO: 6.8 % — SIGNIFICANT CHANGE UP (ref 2–14)
NEUTROPHILS # BLD AUTO: 5.76 K/UL — SIGNIFICANT CHANGE UP (ref 1.8–7.4)
NEUTROPHILS NFR BLD AUTO: 76.7 % — SIGNIFICANT CHANGE UP (ref 43–77)
NITRITE UR-MCNC: NEGATIVE — SIGNIFICANT CHANGE UP
NRBC # BLD: 0 /100 WBCS — SIGNIFICANT CHANGE UP (ref 0–0)
PH UR: 5.5 — SIGNIFICANT CHANGE UP (ref 5–8)
PLATELET # BLD AUTO: 161 K/UL — SIGNIFICANT CHANGE UP (ref 150–400)
POTASSIUM SERPL-MCNC: 4.1 MMOL/L — SIGNIFICANT CHANGE UP (ref 3.5–5.3)
POTASSIUM SERPL-SCNC: 4.1 MMOL/L — SIGNIFICANT CHANGE UP (ref 3.5–5.3)
PROT SERPL-MCNC: 6.9 G/DL — SIGNIFICANT CHANGE UP (ref 6–8.3)
PROT UR-MCNC: 100 MG/DL
RBC # BLD: 4.98 M/UL — SIGNIFICANT CHANGE UP (ref 3.8–5.2)
RBC # FLD: 13.2 % — SIGNIFICANT CHANGE UP (ref 10.3–14.5)
RBC CASTS # UR COMP ASSIST: < 5 /HPF — SIGNIFICANT CHANGE UP
SARS-COV-2 RNA SPEC QL NAA+PROBE: DETECTED
SODIUM SERPL-SCNC: 132 MMOL/L — LOW (ref 135–145)
SP GR SPEC: >=1.03 — SIGNIFICANT CHANGE UP (ref 1–1.03)
UROBILINOGEN FLD QL: 1 E.U./DL — SIGNIFICANT CHANGE UP
WBC # BLD: 7.5 K/UL — SIGNIFICANT CHANGE UP (ref 3.8–10.5)
WBC # FLD AUTO: 7.5 K/UL — SIGNIFICANT CHANGE UP (ref 3.8–10.5)
WBC UR QL: > 10 /HPF

## 2022-05-09 PROCEDURE — 87086 URINE CULTURE/COLONY COUNT: CPT

## 2022-05-09 PROCEDURE — 71046 X-RAY EXAM CHEST 2 VIEWS: CPT

## 2022-05-09 PROCEDURE — 85025 COMPLETE CBC W/AUTO DIFF WBC: CPT

## 2022-05-09 PROCEDURE — U0003: CPT

## 2022-05-09 PROCEDURE — 99284 EMERGENCY DEPT VISIT MOD MDM: CPT | Mod: CS

## 2022-05-09 PROCEDURE — 81001 URINALYSIS AUTO W/SCOPE: CPT

## 2022-05-09 PROCEDURE — U0005: CPT

## 2022-05-09 PROCEDURE — 99285 EMERGENCY DEPT VISIT HI MDM: CPT | Mod: 25

## 2022-05-09 PROCEDURE — 80053 COMPREHEN METABOLIC PANEL: CPT

## 2022-05-09 PROCEDURE — 36415 COLL VENOUS BLD VENIPUNCTURE: CPT

## 2022-05-09 PROCEDURE — 71046 X-RAY EXAM CHEST 2 VIEWS: CPT | Mod: 26

## 2022-05-09 RX ORDER — AZTREONAM 2 G
1 VIAL (EA) INJECTION
Qty: 14 | Refills: 0
Start: 2022-05-09 | End: 2022-05-15

## 2022-05-09 RX ORDER — SODIUM CHLORIDE 9 MG/ML
1000 INJECTION INTRAMUSCULAR; INTRAVENOUS; SUBCUTANEOUS ONCE
Refills: 0 | Status: COMPLETED | OUTPATIENT
Start: 2022-05-09 | End: 2022-05-09

## 2022-05-09 RX ORDER — ACETAMINOPHEN 500 MG
650 TABLET ORAL ONCE
Refills: 0 | Status: COMPLETED | OUTPATIENT
Start: 2022-05-09 | End: 2022-05-09

## 2022-05-09 RX ADMIN — Medication 650 MILLIGRAM(S): at 13:20

## 2022-05-09 RX ADMIN — SODIUM CHLORIDE 1000 MILLILITER(S): 9 INJECTION INTRAMUSCULAR; INTRAVENOUS; SUBCUTANEOUS at 13:20

## 2022-05-09 NOTE — ED PROVIDER NOTE - PATIENT PORTAL LINK FT
You can access the FollowMyHealth Patient Portal offered by [facility] by registering at the following website: [email or web address]. By joining [employer]’s FollowMyHealth portal, you will also be able to view your health information using other applications (apps) compatible with our system.

## 2022-05-09 NOTE — ED ADULT TRIAGE NOTE - CHIEF COMPLAINT QUOTE
Pt reports nausea, diarrhea, abdominal pain, headache, ears pain, abd and  subjective fevers since 3 days ago. Pt denies cough or SOB

## 2022-05-09 NOTE — ED PROVIDER NOTE - ENMT, MLM
Airway patent, Nasal mucosa clear. Mouth with normal mucosa. Throat has no vesicles, no oropharyngeal exudates and uvula is midline.    TM normal and clear, no signs of infection to inner or outer ear. Full ROM of neck, nonmeningeal signs.

## 2022-05-09 NOTE — ED PROVIDER NOTE - CLINICAL SUMMARY MEDICAL DECISION MAKING FREE TEXT BOX
66 y/o F presenting with headache, ear pain, fever, and urinary symptoms since 3 days ago. Pt looks well, slightly tachycardic, likely driven by fevers. Pt feels warm to touch. + suprapubic tenderness concerning cystitis vs UTI vs viral syndrome. Will check labs, viral panel, UA, urin culture, give IV fluids, Tylenol, and reassess.

## 2022-05-09 NOTE — ED ADULT NURSE NOTE - NSIMPLEMENTINTERV_GEN_ALL_ED
Implemented All Universal Safety Interventions:  [place] to call system. Call bell, personal items and telephone within reach. Instruct patient to call for assistance. Room bathroom lighting operational. Non-slip footwear when patient is off stretcher. Physically safe environment: no spills, clutter or unnecessary equipment. Stretcher in lowest position, wheels locked, appropriate side rails in place.

## 2022-05-09 NOTE — ED PROVIDER NOTE - OBJECTIVE STATEMENT
66 y/o F PMHx CAD s/p stent placement 3 weeks ago, HLD, thyroid ca, GERD, HTN, DM, presenting with subjective fevers, frontal headache, and b/l ear pain x 3 days R>L. Also reports 1 episode of diarrhea this morning and also reports abdominal pain with urination x 2 days. Pt has not taken anything for pain. Denies coughing and vomiting.

## 2022-05-09 NOTE — ED PROVIDER NOTE - NSFOLLOWUPINSTRUCTIONS_ED_ALL_ED_FT
Fever    A fever is an increase in the body's temperature above 100.4°F (38°C) or higher. In adults and children older than three months, a brief mild or moderate fever generally has no long-term effect, and it usually does not require treatment. Many times, fevers are the result of viral infections, which are self-resolving.  However, certain symptoms or diagnostic tests may suggest a bacterial infection that may respond to antibiotics. Take medications as directed by your health care provider.    SEEK IMMEDIATE MEDICAL CARE IF YOU OR YOUR CHILD HAVE ANY OF THE FOLLOWING SYMPTOMS : shortness of breath, seizure, rash/stiff neck/headache, severe abdominal pain, persistent vomiting, any signs of dehydration, or if your child has a fever for over five (5) days.     Urinary Tract Infection    A urinary tract infection (UTI) is an infection of any part of the urinary tract, which includes the kidneys, ureters, bladder, and urethra. Risk factors include ignoring your need to urinate, wiping back to front if female, being an uncircumcised male, and having diabetes or a weak immune system. Symptoms include frequent urination, pain or burning with urination, foul smelling urine, cloudy urine, pain in the lower abdomen, blood in the urine, and fever. If you were prescribed an antibiotic medicine, take it as told by your health care provider. Do not stop taking the antibiotic even if you start to feel better.    SEEK IMMEDIATE MEDICAL CARE IF YOU HAVE ANY OF THE FOLLOWING SYMPTOMS: severe back or abdominal pain, fever, inability to keep fluids or medicine down, dizziness/lightheadedness, or a change in mental status.     Viral Respiratory Infection    A viral respiratory infection is an illness that affects parts of the body used for breathing, like the lungs, nose, and throat. It is caused by a germ called a virus. Symptoms can include runny nose, coughing, sneezing, fatigue, body aches, sore throat, fever, or headache. Over the counter medicine can be used to manage the symptoms but the infection typically goes away on its own in 5 to 10 days.     SEEK IMMEDIATE MEDICAL CARE IF YOU HAVE ANY OF THE FOLLOWING SYMPTOMS: shortness of breath, chest pain, fever over 10 days, or lightheadedness/dizziness.

## 2022-05-09 NOTE — ED ADULT NURSE NOTE - OBJECTIVE STATEMENT
68 y/o female c/o right ear pain and febrile since Friday, 1 episode of abdominal pain this morning than had BM w/ abdominal pain relief, difficulty urinating. Pt denies CP, SOB, hematuria.

## 2022-05-09 NOTE — ED PROVIDER NOTE - NSFOLLOWUPCLINICS_GEN_ALL_ED_FT
[facility]  Family Medicine  [address]  [place], [place] [address]  Phone: [phone]  Fax:   Follow Up Time: 1-3 Days

## 2022-05-10 ENCOUNTER — NON-APPOINTMENT (OUTPATIENT)
Age: 67
End: 2022-05-10

## 2022-05-10 LAB — GLUCOSE BLDC GLUCOMTR-MCNC: 206

## 2022-05-12 LAB
CULTURE RESULTS: SIGNIFICANT CHANGE UP
SPECIMEN SOURCE: SIGNIFICANT CHANGE UP

## 2022-05-24 ENCOUNTER — LABORATORY RESULT (OUTPATIENT)
Age: 67
End: 2022-05-24

## 2022-05-26 ENCOUNTER — NON-APPOINTMENT (OUTPATIENT)
Age: 67
End: 2022-05-26

## 2022-05-27 ENCOUNTER — APPOINTMENT (OUTPATIENT)
Dept: ENDOCRINOLOGY | Facility: CLINIC | Age: 67
End: 2022-05-27
Payer: MEDICARE

## 2022-05-27 LAB
ALBUMIN SERPL ELPH-MCNC: 4.7 G/DL
ALP BLD-CCNC: 58 U/L
ALT SERPL-CCNC: 31 U/L
ANION GAP SERPL CALC-SCNC: 15 MMOL/L
AST SERPL-CCNC: 25 U/L
BILIRUB SERPL-MCNC: 0.6 MG/DL
BUN SERPL-MCNC: 13 MG/DL
C PEPTIDE SERPL-MCNC: 1.4 NG/ML
CALCIUM SERPL-MCNC: 9.7 MG/DL
CHLORIDE SERPL-SCNC: 105 MMOL/L
CHOLEST SERPL-MCNC: 147 MG/DL
CO2 SERPL-SCNC: 25 MMOL/L
CREAT SERPL-MCNC: 0.64 MG/DL
CREAT SPEC-SCNC: 85 MG/DL
EGFR: 97 ML/MIN/1.73M2
ESTIMATED AVERAGE GLUCOSE: 246 MG/DL
GLUCOSE SERPL-MCNC: 129 MG/DL
HBA1C MFR BLD HPLC: 10.2 %
HDLC SERPL-MCNC: 30 MG/DL
LDLC SERPL CALC-MCNC: 89 MG/DL
MICROALBUMIN 24H UR DL<=1MG/L-MCNC: 1.6 MG/DL
MICROALBUMIN/CREAT 24H UR-RTO: 19 MG/G
NONHDLC SERPL-MCNC: 118 MG/DL
POTASSIUM SERPL-SCNC: 4.4 MMOL/L
PROT SERPL-MCNC: 6.8 G/DL
SODIUM SERPL-SCNC: 146 MMOL/L
T3FREE SERPL-MCNC: 2.61 PG/ML
T4 FREE SERPL-MCNC: 1.7 NG/DL
THYROGLOB AB SERPL-ACNC: <20 IU/ML
THYROGLOB SERPL-MCNC: <0.2 NG/ML
TRIGL SERPL-MCNC: 143 MG/DL
TSH SERPL-ACNC: 0.96 UIU/ML

## 2022-05-27 PROCEDURE — 99214 OFFICE O/P EST MOD 30 MIN: CPT | Mod: 95

## 2022-05-27 NOTE — HISTORY OF PRESENT ILLNESS
[FreeTextEntry1] : 66 y/o F pt, with Hx of T2DM diagnosed 10 yrs ago, referred by [facility], presents today for endocrine f/u.\par DM related complications: peripheral neuropathy, retinopathy, and CAD (received cardiac stent in 2022). She also has hx of Hypothyroidism (received 2 surgeries for Thyroid CA). No SANDY treatment. \par Last Funduscopic visit: \par Other PMHx: chronic back pain, HTN, HLD, Depression. \par PSHx: Hemithyroidectomy ("12 yrs ago" at [facility]), Completion thyroidectomy (few months later "12 yrs ago" for thyroid CA as per pt; no radiation. Lost care because pt moved to [place])\par Sister: CAD (stents). Brother: CAD (on defibrillator and peace maker). Son  of MI age 45. Daughter: MI at age 40. \par Social Hx: Former smoker: quit 6 yrs ago. No EtOH use. Lives with her daughter.   \par Last Endocrinologist visit: 5-6 months ago in [place]. \par Last cardiologist visit: 2022\par Recent Hospitalization in 2-3 years: Yes, 22 and 22. \par Allergic to: Neurontin\par \par 2022\par Pt did not have any questions prior to the initiation of the telehealth visit. \par Pt presents today via televideo for endocrine f/u felling well with c/o difficulty sleeping. She sleeps 3-4 hours at night and does not like to take naps during the day. Pt reports FBS near 180 and PPBS between 200-250. She is consistent with Basaglar 52 u qd, Novolog 8-10 u ac, and Levothyroxine 125 mcg.\par Denies swallowing difficulties, breathing difficulties, raspy and hoarse voice. \par \par Current Medications: Novolog 12 u ac, basal insulin 52 u, Farxiga 5 mg (initiated 22), Atorvastatin 40 mg, Levothyroxine 125 mcg, Amlodipine 10 mg, ASA 81 mg, Lisinopril 40 mg, Metoprolol 25 mg, Paroxetine 40 mg, Plavix.

## 2022-05-27 NOTE — END OF VISIT
[FreeTextEntry3] : All medical record entries made by the Scribe were at my, Dr. Bear Agrawal, direction and personally dictated by me on 05/27/2022. I have reviewed the chart and agree that the record accurately reflects my personal performance of the history, physical exam, assessment and plan. I have also personally directed, reviewed and agreed with the chart.  [Time Spent: ___ minutes] : I have spent [unfilled] minutes of time on the encounter.

## 2022-05-27 NOTE — THERAPY
[Today's Date] : [unfilled] [Novolog] : Novolog [FreeTextEntry9] : 52 u [de-identified] : 12 u ac [FreeTextEntry7] : Atorvastatin 40 mg, Farxiga 5 mg.

## 2022-05-27 NOTE — DATA REVIEWED
[FreeTextEntry1] : Labs \par - 5/24/22: A1c 10.2%, s.creat 0.64, Ca 9.7, TSH 0.96, Free T3 2.61, Free T4 1.7, TPO ab <10.0, thyroglobulin <0.20, thyroglobulin ab <20.0, urine no protein, LDL-c 89, C-peptide 1.4 \par - 04/16/22 (from discharge note): A1c 10.4% (H), s.creat 0.57, ca 9.2, ALT 36, TSH <0.1

## 2022-05-27 NOTE — ADDENDUM
[FreeTextEntry1] : I, Aries Garza, acted solely as a scribe for Dr. Bear Agrawal on this date. 05/27/2022.

## 2022-05-27 NOTE — ASSESSMENT
[Carbohydrate Consistent Diet] : carbohydrate consistent diet [Importance of Diet and Exercise] : importance of diet and exercise to improve glycemic control, achieve weight loss and improve cardiovascular health [Self Monitoring of Blood Glucose] : self monitoring of blood glucose [Levothyroxine] : The patient was instructed to take Levothyroxine on an empty stomach, separate from vitamins, and wait at least 30 minutes before eating [FreeTextEntry1] : 68 y/o F pt with:\par \par 1. T2DM (dx ~ 10 yrs ago), poorly controlled \par Most recent A1c 10.2% on 5/24/22. \par Pt has chronic insomnia with erratic sleep patterns.\par FBS in the high 100s and PPBS in the mid 200s. BP and LDL-c within normal range. \par Diabetes treatment goals discussed with pt. \par Recommend RD evaluation. Pt is to continue MDI and Farxiga. \par \par 2. Hx of thyroidectomy (12 yrs ago)\par Pt has no obstructive symptoms. She is clinically euthyroid.\par Labs from 5/24/22 show TSH 0.96.\par Thyroid US is being completed today. Will contact pt next week when results are available.\par \par

## 2022-05-27 NOTE — REASON FOR VISIT
[Home] : at home, [unfilled] , at the time of the visit. [Medical Office: ([facility] [place])___] : at the medical office located in  [Verbal consent obtained from patient] : the patient, [unfilled] [Follow - Up] : a follow-up visit [DM Type 2] : DM Type 2 [Thyroid nodule/ MNG] : thyroid nodule/ MNG

## 2022-05-27 NOTE — REVIEW OF SYSTEMS
[As Noted in HPI] : as noted in HPI [Negative] : Heme/Lymph [FreeTextEntry4] : no swallowing difficulties, raspy or hoarse voice [FreeTextEntry6] : no breathing difficulties, [de-identified] : difficulty sleeping

## 2022-06-01 ENCOUNTER — OUTPATIENT (OUTPATIENT)
Dept: OUTPATIENT SERVICES | Facility: HOSPITAL | Age: 67
LOS: 1 days | End: 2022-06-01
Payer: MEDICARE

## 2022-06-01 ENCOUNTER — APPOINTMENT (OUTPATIENT)
Dept: ULTRASOUND IMAGING | Facility: HOSPITAL | Age: 67
End: 2022-06-01
Payer: MEDICARE

## 2022-06-01 DIAGNOSIS — Z90.710 ACQUIRED ABSENCE OF BOTH CERVIX AND UTERUS: Chronic | ICD-10-CM

## 2022-06-01 DIAGNOSIS — Z90.49 ACQUIRED ABSENCE OF OTHER SPECIFIED PARTS OF DIGESTIVE TRACT: Chronic | ICD-10-CM

## 2022-06-01 DIAGNOSIS — E89.0 POSTPROCEDURAL HYPOTHYROIDISM: Chronic | ICD-10-CM

## 2022-06-01 PROCEDURE — 76536 US EXAM OF HEAD AND NECK: CPT | Mod: 26

## 2022-06-01 PROCEDURE — 76536 US EXAM OF HEAD AND NECK: CPT

## 2022-06-16 ENCOUNTER — APPOINTMENT (OUTPATIENT)
Dept: ENDOCRINOLOGY | Facility: CLINIC | Age: 67
End: 2022-06-16
Payer: MEDICARE

## 2022-06-16 VITALS
BODY MASS INDEX: 29.26 KG/M2 | SYSTOLIC BLOOD PRESSURE: 122 MMHG | WEIGHT: 160 LBS | DIASTOLIC BLOOD PRESSURE: 77 MMHG | HEART RATE: 70 BPM

## 2022-06-16 DIAGNOSIS — E11.8 TYPE 2 DIABETES MELLITUS WITH UNSPECIFIED COMPLICATIONS: ICD-10-CM

## 2022-06-16 DIAGNOSIS — E11.65 TYPE 2 DIABETES MELLITUS WITH UNSPECIFIED COMPLICATIONS: ICD-10-CM

## 2022-06-16 DIAGNOSIS — E89.0 POSTPROCEDURAL HYPOTHYROIDISM: ICD-10-CM

## 2022-06-16 PROCEDURE — 82962 GLUCOSE BLOOD TEST: CPT

## 2022-06-16 PROCEDURE — 99215 OFFICE O/P EST HI 40 MIN: CPT | Mod: 25

## 2022-06-17 PROBLEM — E89.0 H/O THYROIDECTOMY: Status: ACTIVE | Noted: 2022-05-05

## 2022-06-17 PROBLEM — E11.8 POORLY CONTROLLED TYPE 2 DIABETES MELLITUS WITH COMPLICATION: Status: ACTIVE | Noted: 2022-05-05

## 2022-06-17 NOTE — ADDENDUM
[FreeTextEntry1] : I, Indira Velez, acted solely as a scribe for Dr. Bear Agrawal on this date 06/16/2022

## 2022-06-17 NOTE — ASSESSMENT
[Levothyroxine] : The patient was instructed to take Levothyroxine on an empty stomach, separate from vitamins, and wait at least 30 minutes before eating [FreeTextEntry1] : 66 y/o F pt with:\par \par 1. T2DM diagnosed ~ 10 yrs ago: \par Glucose readings are improving. Pt suffers from depression, which affects her sleep patterns (she goes to bed past 3 am). Pt takes Novolog ~ 9 am, upon waking up, after drinking coffee. Her breakfast is around 4 pm and her second meal is ~ 2-3 am. Pt has been on Farxiga for the past 4-6 weeks and noticed overall improvement in her BS. She held Basaglar for the past 3-4 weeks. \par POCT today was 178. DM treatment goals were explained to pt again with emphasis on prandial insulin. MDI regimen was updated as follows: Basaglar 22 u (down from 52 u), Novolog 8 u ac, and Farxiga 10 mg (increased from 5). Follow up with podiatrist and ophthalmologist. \par \par 2. History of thyroidectomy ~12 yrs ago, apparently related to Thyroid CA as per pt:\par Pt did not receive SANDY and her recent US from 05/05/22 shows unremarkable exam post thyroidectomy. 05/24/22 Thyroglobulin <0.2 and Thyroglobulin Ab <20.\par Pt is clinically and biochemically euthyroid. No pathology reports for her surgeries are available. However, if thyroidectomy was related to CA, biochemical and structural evidence does not show tumor recurrence. Continue Levothyroxine 125 mcg. \par \par Return in: 4 months. \par  [Diabetes Foot Care] : diabetes foot care [Importance of Diet and Exercise] : importance of diet and exercise to improve glycemic control, achieve weight loss and improve cardiovascular health [Exercise/Effect on Glucose] : exercise/effect on glucose [Self Monitoring of Blood Glucose] : self monitoring of blood glucose

## 2022-06-17 NOTE — HISTORY OF PRESENT ILLNESS
[Hypoglycemia] : Patient is hypoglycemic. [FreeTextEntry1] : 68 y/o F pt, with Hx of T2DM diagnosed in , referred by [facility], presents today for endocrine f/u.\par DM related complications: peripheral neuropathy, retinopathy, and CAD (received cardiac stent in 2022). She also has hx of Hypothyroidism (received 2 surgeries for Thyroid CA). No SANDY treatment. \par Last Funduscopic visit: \par Other PMHx: chronic back pain, HTN, HLD, Depression. \par PSHx: Hemithyroidectomy ("" at [facility]), Completion thyroidectomy (few months later "in " for thyroid CA as per pt; no radiation. Lost care because pt moved to [place])\par Sister: CAD (stents). Brother: CAD (on defibrillator and peace maker). Son  of MI age 45. Daughter: MI at age 40. \par Social Hx: Coffee. Former smoker: quit 6 yrs ago. No EtOH use. Lives with her daughter.   \par Lifestyle: 2 meals at 4pm, 2-3 am. \par Allergic to: Neurontin\par Last Endocrinologist visit: 5-6 months ago in [place]. \par Last cardiologist visit: 2022\par Recent Hospitalization in 2-3 years: Yes, 22 and 22. \par \par 2022\par Pt did not have any questions prior to the initiation of the telehealth visit. \par Pt presents today via televideo for endocrine f/u felling well with c/o difficulty sleeping. She sleeps 3-4 hours at night and does not like to take naps during the day. Pt reports FBS near 180 and PPBS between 200-250. She is consistent with Basaglar 52 u qd, Novolog 8-10 u ac, and Levothyroxine 125 mcg.\par Denies swallowing difficulties, breathing difficulties, raspy and hoarse voice. \par \par 2022\par Pt has POCT 168, /77 and BMI 29.26. She gained 3 lbs in 1 month. \par Today, pt is feeling fine, with c/o depression, which prevents her from falling asleep (she indulges in eating) and negatively affects her DM management. Meal schedule: 4 pm, 2-3 am. She takes 8 u in the morning without breakfast, 8 u after her first meal, and 10 u after second meal at 2-3 am. FBS are below 200. FBS today was 159. Ever since starting on Farxiga, pt has been experiencing hypoglycemia. Of note, pt stopped Basaglar since 22. \par \par Current Medications: Novolog 8/8/10 u ac (takes morning insulin without breakfast), Farxiga 5 mg (initiated 22), Atorvastatin 40 mg, Levothyroxine 125 mcg, Amlodipine 10 mg, ASA 81 mg, Lisinopril 40 mg, Metoprolol 25 mg, Paroxetine 40 mg, Plavix. \par \par Medication modified/added this visit: Basaglar 22 u qpm (reinitiated 2022; self-Dced 22), Novolog 8 u ac (decreased 2022), Farxiga 10 mg (increased 2022; initiated 22)\par - Held: Basaglar 52 u (self-Dced 22)\par  [SANDY] : patient was not treated with radioactive iodine [de-identified] : 2010 [de-identified] : Hemithyroidectomy [de-identified] : 2010 [de-identified] : 06/01/22  [de-identified] : Head&Neck US: No solid or cystic lesions are present in the thyroid bed. Few subcentimeter morphologically normal LN. No pathological adenopathy. Impression: Unremarkable post thyroidectomy US.  [de-identified] : Had

## 2022-06-17 NOTE — THERAPY
[Today's Date] : [unfilled] [Novolog] : Novolog [Basaglar] : Basaglar [FreeTextEntry9] : 22 u [de-identified] : 8 u ac [FreeTextEntry7] : Atorvastatin 40 mg, Farxiga 10 mg.

## 2022-06-17 NOTE — PHYSICAL EXAM
[Alert] : alert [Normal Sclera/Conjunctiva] : normal sclera/conjunctiva [Normal Outer Ear/Nose] : the ears and nose were normal in appearance [No Neck Mass] : no neck mass was observed [Clear to Auscultation] : lungs were clear to auscultation bilaterally [Normal Rate] : heart rate was normal [Regular Rhythm] : with a regular rhythm [No Edema] : no peripheral edema [Spine Straight] : spine straight [No Stigmata of Cushings Syndrome] : no stigmata of Cushings Syndrome [Normal Gait] : normal gait [Right foot was examined, including] : right foot ~C was examined, including visual inspection with sensory and pulse exams [Left foot was examined, including] : left foot ~C was examined, including visual inspection with sensory and pulse exams [2+] : 2+ in the dorsalis pedis [Normal Reflexes] : deep tendon reflexes were 2+ and symmetric [No Tremors] : no tremors [Oriented x3] : oriented to person, place, and time [Acanthosis Nigricans] : no acanthosis nigricans [Vibration Dec.] : normal vibratory sensation at the level of the toes [de-identified] : periorbital edema.

## 2022-06-17 NOTE — REVIEW OF SYSTEMS
[Negative] : Heme/Lymph [As Noted in HPI] : as noted in HPI [Depression] : depression [de-identified] : difficulty sleeping

## 2022-06-17 NOTE — DATA REVIEWED
[FreeTextEntry1] : Labs:\par - 5/24/22: A1c 10.2%, s.creat 0.64, Ca 9.7, TSH 0.96, Free T3 2.61, Free T4 1.7, TPO ab <10.0, thyroglobulin <0.20, thyroglobulin ab <20.0, urine no protein, LDL-c 89, C-peptide 1.4 \par - 04/16/22 (from discharge note): A1c 10.4% (H), s.creat 0.57, ca 9.2, ALT 36, TSH <0.1 \par \par Imaging:\par - 06/01/22 Head&Neck US: No solid or cystic lesions are present in the thyroid bed. Few subcentimeter morphologically normal LN. No pathological adenopathy. Impression: Unremarkable post thyroidectomy US.

## 2022-06-29 ENCOUNTER — EMERGENCY (EMERGENCY)
Facility: HOSPITAL | Age: 67
LOS: 1 days | Discharge: HOME | End: 2022-06-29
Admitting: EMERGENCY MEDICINE
Payer: MEDICARE

## 2022-06-29 VITALS
HEART RATE: 79 BPM | DIASTOLIC BLOOD PRESSURE: 83 MMHG | TEMPERATURE: 98 F | SYSTOLIC BLOOD PRESSURE: 156 MMHG | OXYGEN SATURATION: 99 % | RESPIRATION RATE: 18 BRPM | HEIGHT: 62 IN

## 2022-06-29 VITALS
SYSTOLIC BLOOD PRESSURE: 145 MMHG | HEART RATE: 76 BPM | RESPIRATION RATE: 18 BRPM | DIASTOLIC BLOOD PRESSURE: 77 MMHG | OXYGEN SATURATION: 97 %

## 2022-06-29 DIAGNOSIS — Z95.5 PRESENCE OF CORONARY ANGIOPLASTY IMPLANT AND GRAFT: ICD-10-CM

## 2022-06-29 DIAGNOSIS — E78.5 HYPERLIPIDEMIA, UNSPECIFIED: ICD-10-CM

## 2022-06-29 DIAGNOSIS — Z88.8 ALLERGY STATUS TO OTHER DRUGS, MEDICAMENTS AND BIOLOGICAL SUBSTANCES STATUS: ICD-10-CM

## 2022-06-29 DIAGNOSIS — Z85.850 PERSONAL HISTORY OF MALIGNANT NEOPLASM OF THYROID: ICD-10-CM

## 2022-06-29 DIAGNOSIS — I10 ESSENTIAL (PRIMARY) HYPERTENSION: ICD-10-CM

## 2022-06-29 DIAGNOSIS — Z90.710 ACQUIRED ABSENCE OF BOTH CERVIX AND UTERUS: Chronic | ICD-10-CM

## 2022-06-29 DIAGNOSIS — E11.9 TYPE 2 DIABETES MELLITUS WITHOUT COMPLICATIONS: ICD-10-CM

## 2022-06-29 DIAGNOSIS — E89.0 POSTPROCEDURAL HYPOTHYROIDISM: Chronic | ICD-10-CM

## 2022-06-29 DIAGNOSIS — M79.661 PAIN IN RIGHT LOWER LEG: ICD-10-CM

## 2022-06-29 DIAGNOSIS — Z90.49 ACQUIRED ABSENCE OF OTHER SPECIFIED PARTS OF DIGESTIVE TRACT: Chronic | ICD-10-CM

## 2022-06-29 DIAGNOSIS — K21.9 GASTRO-ESOPHAGEAL REFLUX DISEASE WITHOUT ESOPHAGITIS: ICD-10-CM

## 2022-06-29 DIAGNOSIS — I25.10 ATHEROSCLEROTIC HEART DISEASE OF NATIVE CORONARY ARTERY WITHOUT ANGINA PECTORIS: ICD-10-CM

## 2022-06-29 LAB
ALBUMIN SERPL ELPH-MCNC: 4.9 G/DL — SIGNIFICANT CHANGE UP (ref 3.3–5)
ALP SERPL-CCNC: 62 U/L — SIGNIFICANT CHANGE UP (ref 40–120)
ALT FLD-CCNC: 28 U/L — SIGNIFICANT CHANGE UP (ref 10–45)
ANION GAP SERPL CALC-SCNC: 12 MMOL/L — SIGNIFICANT CHANGE UP (ref 5–17)
APTT BLD: 35.2 SEC — SIGNIFICANT CHANGE UP (ref 27.5–35.5)
AST SERPL-CCNC: 20 U/L — SIGNIFICANT CHANGE UP (ref 10–40)
BASOPHILS # BLD AUTO: 0.03 K/UL — SIGNIFICANT CHANGE UP (ref 0–0.2)
BASOPHILS NFR BLD AUTO: 0.5 % — SIGNIFICANT CHANGE UP (ref 0–2)
BILIRUB SERPL-MCNC: 0.6 MG/DL — SIGNIFICANT CHANGE UP (ref 0.2–1.2)
BUN SERPL-MCNC: 13 MG/DL — SIGNIFICANT CHANGE UP (ref 7–23)
CALCIUM SERPL-MCNC: 9.6 MG/DL — SIGNIFICANT CHANGE UP (ref 8.4–10.5)
CHLORIDE SERPL-SCNC: 105 MMOL/L — SIGNIFICANT CHANGE UP (ref 96–108)
CO2 SERPL-SCNC: 24 MMOL/L — SIGNIFICANT CHANGE UP (ref 22–31)
CREAT SERPL-MCNC: 0.58 MG/DL — SIGNIFICANT CHANGE UP (ref 0.5–1.3)
EGFR: 100 ML/MIN/1.73M2 — SIGNIFICANT CHANGE UP
EOSINOPHIL # BLD AUTO: 0.2 K/UL — SIGNIFICANT CHANGE UP (ref 0–0.5)
EOSINOPHIL NFR BLD AUTO: 3.1 % — SIGNIFICANT CHANGE UP (ref 0–6)
GLUCOSE SERPL-MCNC: 173 MG/DL — HIGH (ref 70–99)
HCT VFR BLD CALC: 41 % — SIGNIFICANT CHANGE UP (ref 34.5–45)
HGB BLD-MCNC: 13.9 G/DL — SIGNIFICANT CHANGE UP (ref 11.5–15.5)
IMM GRANULOCYTES NFR BLD AUTO: 0.3 % — SIGNIFICANT CHANGE UP (ref 0–1.5)
INR BLD: 1.02 — SIGNIFICANT CHANGE UP (ref 0.88–1.16)
LYMPHOCYTES # BLD AUTO: 1.87 K/UL — SIGNIFICANT CHANGE UP (ref 1–3.3)
LYMPHOCYTES # BLD AUTO: 29.2 % — SIGNIFICANT CHANGE UP (ref 13–44)
MCHC RBC-ENTMCNC: 28.8 PG — SIGNIFICANT CHANGE UP (ref 27–34)
MCHC RBC-ENTMCNC: 33.9 GM/DL — SIGNIFICANT CHANGE UP (ref 32–36)
MCV RBC AUTO: 85.1 FL — SIGNIFICANT CHANGE UP (ref 80–100)
MONOCYTES # BLD AUTO: 0.38 K/UL — SIGNIFICANT CHANGE UP (ref 0–0.9)
MONOCYTES NFR BLD AUTO: 5.9 % — SIGNIFICANT CHANGE UP (ref 2–14)
NEUTROPHILS # BLD AUTO: 3.91 K/UL — SIGNIFICANT CHANGE UP (ref 1.8–7.4)
NEUTROPHILS NFR BLD AUTO: 61 % — SIGNIFICANT CHANGE UP (ref 43–77)
NRBC # BLD: 0 /100 WBCS — SIGNIFICANT CHANGE UP (ref 0–0)
PLATELET # BLD AUTO: 228 K/UL — SIGNIFICANT CHANGE UP (ref 150–400)
POTASSIUM SERPL-MCNC: 4.3 MMOL/L — SIGNIFICANT CHANGE UP (ref 3.5–5.3)
POTASSIUM SERPL-SCNC: 4.3 MMOL/L — SIGNIFICANT CHANGE UP (ref 3.5–5.3)
PROT SERPL-MCNC: 7.3 G/DL — SIGNIFICANT CHANGE UP (ref 6–8.3)
PROTHROM AB SERPL-ACNC: 12.2 SEC — SIGNIFICANT CHANGE UP (ref 10.5–13.4)
RBC # BLD: 4.82 M/UL — SIGNIFICANT CHANGE UP (ref 3.8–5.2)
RBC # FLD: 13.5 % — SIGNIFICANT CHANGE UP (ref 10.3–14.5)
SODIUM SERPL-SCNC: 141 MMOL/L — SIGNIFICANT CHANGE UP (ref 135–145)
WBC # BLD: 6.41 K/UL — SIGNIFICANT CHANGE UP (ref 3.8–10.5)
WBC # FLD AUTO: 6.41 K/UL — SIGNIFICANT CHANGE UP (ref 3.8–10.5)

## 2022-06-29 PROCEDURE — 85025 COMPLETE CBC W/AUTO DIFF WBC: CPT

## 2022-06-29 PROCEDURE — 80053 COMPREHEN METABOLIC PANEL: CPT

## 2022-06-29 PROCEDURE — 99284 EMERGENCY DEPT VISIT MOD MDM: CPT | Mod: 25

## 2022-06-29 PROCEDURE — 99284 EMERGENCY DEPT VISIT MOD MDM: CPT

## 2022-06-29 PROCEDURE — 93971 EXTREMITY STUDY: CPT | Mod: 26,RT

## 2022-06-29 PROCEDURE — 36415 COLL VENOUS BLD VENIPUNCTURE: CPT

## 2022-06-29 PROCEDURE — 85730 THROMBOPLASTIN TIME PARTIAL: CPT

## 2022-06-29 PROCEDURE — 85610 PROTHROMBIN TIME: CPT

## 2022-06-29 PROCEDURE — 93971 EXTREMITY STUDY: CPT

## 2022-06-29 RX ORDER — TRAMADOL HYDROCHLORIDE 50 MG/1
1 TABLET ORAL
Qty: 8 | Refills: 0
Start: 2022-06-29 | End: 2022-06-30

## 2022-06-29 RX ORDER — TRAMADOL HYDROCHLORIDE 50 MG/1
50 TABLET ORAL ONCE
Refills: 0 | Status: DISCONTINUED | OUTPATIENT
Start: 2022-06-29 | End: 2022-06-29

## 2022-06-29 RX ADMIN — TRAMADOL HYDROCHLORIDE 50 MILLIGRAM(S): 50 TABLET ORAL at 16:50

## 2022-06-29 NOTE — ED PROVIDER NOTE - CLINICAL SUMMARY MEDICAL DECISION MAKING FREE TEXT BOX
68 y/o F PMHx CAD s/p stent, HLD, thyroid ca, GERD, HTN, DM c/o R leg pain x 2 days. Pain started in the lateral upper thigh and goes down her whole leg to her toes. Denies trauma. Denies numbness, tingling, back pain, hip pain, swelling, redness, fever, chills. Taking tylenol with mild relief.  RLE- no swelling, no erythema or warmth, +tenderness to lateral thigh and calf, FROM, NV intact 66 y/o F PMHx CAD s/p stent, HLD, thyroid ca, GERD, HTN, DM c/o R leg pain x 2 days. Pain started in the lateral upper thigh and goes down her whole leg to her toes. Denies trauma. Denies numbness, tingling, back pain, hip pain, swelling, redness, fever, chills. Taking tylenol with mild relief.  RLE- no swelling, no erythema or warmth, +tenderness to lateral thigh and calf, FROM, NV intact. Neg SLR. Sono neg for DVT. Pain improved with pain control, likely msk pain. Will f/u with pcp

## 2022-06-29 NOTE — ED PROVIDER NOTE - OBJECTIVE STATEMENT
66 y/o F PMHx CAD s/p stent, HLD, thyroid ca, GERD, HTN, DM c/o R leg pain x 2 days. Pain started in the lateral upper thigh and goes down her whole leg to her toes. Denies trauma. Denies numbness, tingling, back pain, hip pain, swelling, redness, fever, chills. Taking tylenol with mild relief. No recent surgery. Denies smoking. Travelled to [place] 2 weeks ago.

## 2022-06-29 NOTE — ED ADULT NURSE NOTE - FINAL NURSING ELECTRONIC SIGNATURE
Xeljanz Counseling: I discussed with the patient the risks of Xeljanz therapy including increased risk of infection, liver issues, headache, diarrhea, or cold symptoms. Live vaccines should be avoided. They were instructed to call if they have any problems. 29-Jun-2022 19:08

## 2022-06-29 NOTE — ED ADULT NURSE NOTE - OBJECTIVE STATEMENT
67F presents to ED for right lower leg pain from upper thigh down x 2 days. Pt states that she recently was hospitalized in May for cardiac cath. Pt endorses mild numbness/tingling to extremity last night. No obvious swelling noted. Pt on blood thinner for stent placement.

## 2022-06-29 NOTE — ED PROVIDER NOTE - PHYSICAL EXAMINATION
CONSTITUTIONAL: Well-appearing;  in no apparent distress.   HEAD: Normocephalic; atraumatic.   EYES: PERRL; EOM intact; conjunctiva and sclera clear  ENT: normal nose; no rhinorrhea; normal pharynx with no erythema or lesions.   NECK: Supple; non-tender;   CARDIOVASCULAR: rrr, no audible murmurs   RESPIRATORY: Breathing easily;   MSK: RLE- no swelling, no erythema or warmth, +tenderness to lateral thigh and calf, FROM, NV intact    EXT: No cyanosis or edema; N/V intact  SKIN: Normal for age and race; warm; dry; good turgor; no apparent lesions or rash.

## 2022-06-29 NOTE — ED PROVIDER NOTE - PATIENT PORTAL LINK FT
You can access the FollowMyHealth Patient Portal offered by [facility] by registering at the following website: [email or web address]. By joining [facility]’s FollowMyHealth portal, you will also be able to view your health information using other applications (apps) compatible with our system.

## 2022-07-12 RX ORDER — ISOSORBIDE MONONITRATE 30 MG/1
30 TABLET, EXTENDED RELEASE ORAL DAILY
Refills: 0 | Status: ACTIVE | COMMUNITY
Start: 2022-07-12

## 2022-07-25 ENCOUNTER — APPOINTMENT (OUTPATIENT)
Dept: ENDOCRINOLOGY | Facility: CLINIC | Age: 67
End: 2022-07-25

## 2022-08-29 ENCOUNTER — APPOINTMENT (OUTPATIENT)
Dept: HEART AND VASCULAR | Facility: CLINIC | Age: 67
End: 2022-08-29

## 2022-10-03 ENCOUNTER — APPOINTMENT (OUTPATIENT)
Dept: ENDOCRINOLOGY | Facility: CLINIC | Age: 67
End: 2022-10-03

## 2022-10-18 LAB — GLUCOSE BLDC GLUCOMTR-MCNC: 168

## 2023-01-09 NOTE — ED PROVIDER NOTE - OBJECTIVE STATEMENT
Call triage nurse with chills and/or temperature greater than or equal to 100.4, uncontrolled nausea/vomiting, diarrhea, constipation, dizziness, shortness of breath, chest pain, bleeding, unexplained bruising, or any new/concerning symptoms, questions/concerns.   If you are having any concerning symptoms or wish to speak to a provider before your next infusion visit, please call your care coordinator or triage to notify them so we can adequately serve you.   [facility] phone number is [phone]    68 y/o F with a pmhx of DM, HTN, and "pinched nerve on upper back" presents to the ED c/o severe pain for x2 days across upper back described as "tearing" and with a pain that is described as unlike anything from before. Yesterdays pain and persisted and began to migrate down her back. OTC pain medication not helping. Today woke up with numbness and weakness to right leg since the morning and having pain in her chest. Denies any syncope, fever, chills, palpitations, or SOB. Pt has a significant family history, son  of heart attack last year and daughter with a hx of brain aneurism. No cocaine or drug usage. Former cigarette smoker.

## 2023-03-08 NOTE — ED ADULT TRIAGE NOTE - HEIGHT IN INCHES
I called at pt's mom number and she gave phone to pt. I spoke to pt and relayed the message per mmrn. Pt v/u. 2

## 2023-12-05 NOTE — ED ADULT TRIAGE NOTE - NS ED NURSE DIRECT TO ROOM YN
Received EXTERNAL referral for EMG from Dr. Uche Alex at [facility].   Phone:[phone]  Fax: [phone]    Referral was for Left Wrist tendinitis, left shoulder pain.    Patient also had referral in system for right wrist.    Patient was scheduled for EMG with Dr. Diana.  External referral was sent to be scanned in to patient chart.   No

## 2024-12-12 NOTE — PROGRESS NOTE ADULT - NS ATTEND AMEND GEN_ALL_CORE FT
IUD INSERTION AFTER CARE    Today you had an IUD inserted. It is common to have some uterine crampiness and light bleeding after the insertion.  Please refer to the IUD information pamphlet that was provided to familiarize yourself with the side effects.      Call the office if you develop worrisome symptoms such as very heavy bleeding or severe pain.  Call us also if  you develop fever, chills or foul smelling discharge    Your partner may feel the strings for a few weeks but they should not be painful    Refer to the IUD information pamphlet on checking the strings    Return to the clinic in one month for a check    
The patient is S/P Catheterization.,results unknown, Glucose levels were [phone] last night and today [phone]. I agree with Insulin treatment with 40 units Lantus Insulin and 6 units pre-meal Insulin.
